# Patient Record
Sex: FEMALE | Race: WHITE | Employment: OTHER | URBAN - NONMETROPOLITAN AREA
[De-identification: names, ages, dates, MRNs, and addresses within clinical notes are randomized per-mention and may not be internally consistent; named-entity substitution may affect disease eponyms.]

---

## 2017-07-03 ENCOUNTER — TELEPHONE (OUTPATIENT)
Dept: CARDIOLOGY | Age: 62
End: 2017-07-03

## 2019-03-17 ENCOUNTER — HOSPITAL ENCOUNTER (EMERGENCY)
Age: 64
Discharge: STILL ADMITTED OR ADMINISTRATIVE | End: 2019-03-17

## 2019-10-07 ENCOUNTER — HOSPITAL ENCOUNTER (EMERGENCY)
Age: 64
Discharge: HOME | End: 2019-10-07
Attending: EMERGENCY MEDICINE
Payer: MEDICARE

## 2019-10-07 ENCOUNTER — APPOINTMENT (OUTPATIENT)
Dept: GENERAL RADIOLOGY | Age: 64
End: 2019-10-07
Payer: MEDICARE

## 2019-10-07 VITALS
TEMPERATURE: 97.8 F | RESPIRATION RATE: 16 BRPM | DIASTOLIC BLOOD PRESSURE: 78 MMHG | BODY MASS INDEX: 38.41 KG/M2 | HEIGHT: 64 IN | WEIGHT: 225 LBS | SYSTOLIC BLOOD PRESSURE: 149 MMHG | HEART RATE: 82 BPM | OXYGEN SATURATION: 98 %

## 2019-10-07 DIAGNOSIS — S42.255A CLOSED NONDISPLACED FRACTURE OF GREATER TUBEROSITY OF LEFT HUMERUS, INITIAL ENCOUNTER: Primary | ICD-10-CM

## 2019-10-07 PROCEDURE — 2709999900 HC NON-CHARGEABLE SUPPLY

## 2019-10-07 PROCEDURE — 99283 EMERGENCY DEPT VISIT LOW MDM: CPT

## 2019-10-07 PROCEDURE — 73060 X-RAY EXAM OF HUMERUS: CPT

## 2019-10-07 PROCEDURE — 6370000000 HC RX 637 (ALT 250 FOR IP): Performed by: EMERGENCY MEDICINE

## 2019-10-07 ASSESSMENT — PAIN DESCRIPTION - LOCATION: LOCATION: SHOULDER

## 2019-10-07 ASSESSMENT — PAIN DESCRIPTION - ORIENTATION: ORIENTATION: LEFT

## 2019-10-07 ASSESSMENT — PAIN SCALES - GENERAL: PAINLEVEL_OUTOF10: 9

## 2019-10-15 ENCOUNTER — HOSPITAL ENCOUNTER (OUTPATIENT)
Age: 64
Discharge: HOME | End: 2019-10-15
Payer: MEDICARE

## 2019-10-15 ENCOUNTER — HOSPITAL ENCOUNTER (OUTPATIENT)
Dept: GENERAL RADIOLOGY | Age: 64
Discharge: HOME | End: 2019-10-15
Payer: MEDICARE

## 2019-10-15 DIAGNOSIS — M25.512 LEFT SHOULDER PAIN, UNSPECIFIED CHRONICITY: ICD-10-CM

## 2019-10-15 PROCEDURE — 73030 X-RAY EXAM OF SHOULDER: CPT

## 2019-10-23 ENCOUNTER — HOSPITAL ENCOUNTER (OUTPATIENT)
Dept: CT IMAGING | Age: 64
Discharge: HOME | End: 2019-10-23
Payer: MEDICARE

## 2019-10-23 ENCOUNTER — HOSPITAL ENCOUNTER (OUTPATIENT)
Dept: GENERAL RADIOLOGY | Age: 64
Discharge: HOME | End: 2019-10-23
Payer: MEDICARE

## 2019-10-23 DIAGNOSIS — S42.255D CLOSED NONDISPLACED FRACTURE OF GREATER TUBEROSITY OF LEFT HUMERUS WITH ROUTINE HEALING, SUBSEQUENT ENCOUNTER: ICD-10-CM

## 2019-10-23 DIAGNOSIS — S42.255A CLOSED NONDISPLACED FRACTURE OF GREATER TUBEROSITY OF LEFT HUMERUS, INITIAL ENCOUNTER: ICD-10-CM

## 2019-10-23 PROCEDURE — 6360000004 HC RX CONTRAST MEDICATION: Performed by: ORTHOPAEDIC SURGERY

## 2019-10-23 PROCEDURE — 73201 CT UPPER EXTREMITY W/DYE: CPT

## 2019-10-23 PROCEDURE — 77002 NEEDLE LOCALIZATION BY XRAY: CPT

## 2019-10-23 PROCEDURE — 23350 INJECTION FOR SHOULDER X-RAY: CPT

## 2019-10-23 RX ADMIN — IOTHALAMATE MEGLUMINE 10 ML: 600 INJECTION INTRAVASCULAR at 10:53

## 2019-10-23 RX ADMIN — IOTHALAMATE MEGLUMINE 5 ML: 600 INJECTION INTRAVASCULAR at 10:10

## 2019-11-06 ENCOUNTER — HOSPITAL ENCOUNTER (OUTPATIENT)
Dept: GENERAL RADIOLOGY | Age: 64
Discharge: HOME | End: 2019-11-06
Payer: MEDICARE

## 2019-11-06 ENCOUNTER — HOSPITAL ENCOUNTER (OUTPATIENT)
Age: 64
Discharge: HOME | End: 2019-11-06
Payer: MEDICARE

## 2019-11-06 DIAGNOSIS — Z01.818 PREOP EXAMINATION: ICD-10-CM

## 2019-11-06 LAB
ANION GAP SERPL CALCULATED.3IONS-SCNC: 11 MEQ/L (ref 8–16)
BASOPHILS # BLD: 0.5 %
BASOPHILS ABSOLUTE: 0 THOU/MM3 (ref 0–0.1)
BUN BLDV-MCNC: 18 MG/DL (ref 7–22)
CALCIUM SERPL-MCNC: 9.9 MG/DL (ref 8.5–10.5)
CHLORIDE BLD-SCNC: 103 MEQ/L (ref 98–111)
CO2: 27 MEQ/L (ref 23–33)
CREAT SERPL-MCNC: 0.8 MG/DL (ref 0.4–1.2)
EOSINOPHIL # BLD: 2.7 %
EOSINOPHILS ABSOLUTE: 0.2 THOU/MM3 (ref 0–0.4)
ERYTHROCYTE [DISTWIDTH] IN BLOOD BY AUTOMATED COUNT: 12.3 % (ref 11.5–14.5)
ERYTHROCYTE [DISTWIDTH] IN BLOOD BY AUTOMATED COUNT: 42.5 FL (ref 35–45)
GFR SERPL CREATININE-BSD FRML MDRD: 72 ML/MIN/1.73M2
GLUCOSE BLD-MCNC: 95 MG/DL (ref 70–108)
HCT VFR BLD CALC: 37 % (ref 37–47)
HEMOGLOBIN: 11.8 GM/DL (ref 12–16)
IMMATURE GRANS (ABS): 0.02 THOU/MM3 (ref 0–0.07)
IMMATURE GRANULOCYTES: 0.3 %
LYMPHOCYTES # BLD: 37.2 %
LYMPHOCYTES ABSOLUTE: 2.5 THOU/MM3 (ref 1–4.8)
MCH RBC QN AUTO: 30.3 PG (ref 26–33)
MCHC RBC AUTO-ENTMCNC: 31.9 GM/DL (ref 32.2–35.5)
MCV RBC AUTO: 94.9 FL (ref 81–99)
MONOCYTES # BLD: 6.6 %
MONOCYTES ABSOLUTE: 0.4 THOU/MM3 (ref 0.4–1.3)
MRSA NASAL SCREEN RT-PCR: NEGATIVE
NUCLEATED RED BLOOD CELLS: 0 /100 WBC
PLATELET # BLD: 219 THOU/MM3 (ref 130–400)
PMV BLD AUTO: 10.8 FL (ref 9.4–12.4)
POTASSIUM SERPL-SCNC: 3.8 MEQ/L (ref 3.5–5.2)
RBC # BLD: 3.9 MILL/MM3 (ref 4.2–5.4)
SEG NEUTROPHILS: 52.7 %
SEGMENTED NEUTROPHILS ABSOLUTE COUNT: 3.5 THOU/MM3 (ref 1.8–7.7)
SODIUM BLD-SCNC: 141 MEQ/L (ref 135–145)
STAPH AUREUS SCREEN RT-PCR: NEGATIVE
WBC # BLD: 6.6 THOU/MM3 (ref 4.8–10.8)

## 2019-11-06 PROCEDURE — 87640 STAPH A DNA AMP PROBE: CPT

## 2019-11-06 PROCEDURE — 80048 BASIC METABOLIC PNL TOTAL CA: CPT

## 2019-11-06 PROCEDURE — 85025 COMPLETE CBC W/AUTO DIFF WBC: CPT

## 2019-11-06 PROCEDURE — 71046 X-RAY EXAM CHEST 2 VIEWS: CPT

## 2019-11-06 PROCEDURE — 36415 COLL VENOUS BLD VENIPUNCTURE: CPT

## 2019-11-06 PROCEDURE — 87641 MR-STAPH DNA AMP PROBE: CPT

## 2019-11-27 ENCOUNTER — HOSPITAL ENCOUNTER (OUTPATIENT)
Age: 64
Setting detail: OUTPATIENT SURGERY
Discharge: HOME | End: 2019-11-27
Attending: ORTHOPAEDIC SURGERY | Admitting: ORTHOPAEDIC SURGERY
Payer: MEDICARE

## 2019-11-27 ENCOUNTER — ANESTHESIA (OUTPATIENT)
Dept: OPERATING ROOM | Age: 64
End: 2019-11-27
Payer: MEDICARE

## 2019-11-27 ENCOUNTER — ANESTHESIA EVENT (OUTPATIENT)
Dept: OPERATING ROOM | Age: 64
End: 2019-11-27
Payer: MEDICARE

## 2019-11-27 VITALS
RESPIRATION RATE: 3 BRPM | SYSTOLIC BLOOD PRESSURE: 130 MMHG | OXYGEN SATURATION: 90 % | DIASTOLIC BLOOD PRESSURE: 59 MMHG | TEMPERATURE: 96.8 F

## 2019-11-27 VITALS
HEART RATE: 83 BPM | BODY MASS INDEX: 37.35 KG/M2 | HEIGHT: 67 IN | WEIGHT: 238 LBS | OXYGEN SATURATION: 92 % | TEMPERATURE: 97.2 F | RESPIRATION RATE: 18 BRPM | DIASTOLIC BLOOD PRESSURE: 65 MMHG | SYSTOLIC BLOOD PRESSURE: 108 MMHG

## 2019-11-27 DIAGNOSIS — S46.012A TRAUMATIC TEAR OF LEFT ROTATOR CUFF, UNSPECIFIED TEAR EXTENT, INITIAL ENCOUNTER: ICD-10-CM

## 2019-11-27 DIAGNOSIS — S42.255D CLOSED NONDISPLACED FRACTURE OF GREATER TUBEROSITY OF LEFT HUMERUS WITH ROUTINE HEALING, SUBSEQUENT ENCOUNTER: ICD-10-CM

## 2019-11-27 DIAGNOSIS — M75.42 IMPINGEMENT SYNDROME OF LEFT SHOULDER: Primary | ICD-10-CM

## 2019-11-27 LAB — POTASSIUM SERPL-SCNC: 4.6 MEQ/L (ref 3.5–5.2)

## 2019-11-27 PROCEDURE — 3700000000 HC ANESTHESIA ATTENDED CARE: Performed by: ORTHOPAEDIC SURGERY

## 2019-11-27 PROCEDURE — 3600000005 HC SURGERY LEVEL 5 BASE: Performed by: ORTHOPAEDIC SURGERY

## 2019-11-27 PROCEDURE — 36415 COLL VENOUS BLD VENIPUNCTURE: CPT

## 2019-11-27 PROCEDURE — 2500000003 HC RX 250 WO HCPCS: Performed by: REGISTERED NURSE

## 2019-11-27 PROCEDURE — 3600000015 HC SURGERY LEVEL 5 ADDTL 15MIN: Performed by: ORTHOPAEDIC SURGERY

## 2019-11-27 PROCEDURE — 2709999900 HC NON-CHARGEABLE SUPPLY: Performed by: ORTHOPAEDIC SURGERY

## 2019-11-27 PROCEDURE — 2580000003 HC RX 258: Performed by: REGISTERED NURSE

## 2019-11-27 PROCEDURE — 6360000002 HC RX W HCPCS: Performed by: ORTHOPAEDIC SURGERY

## 2019-11-27 PROCEDURE — 7100000001 HC PACU RECOVERY - ADDTL 15 MIN: Performed by: ORTHOPAEDIC SURGERY

## 2019-11-27 PROCEDURE — 6360000002 HC RX W HCPCS: Performed by: REGISTERED NURSE

## 2019-11-27 PROCEDURE — 7100000011 HC PHASE II RECOVERY - ADDTL 15 MIN: Performed by: ORTHOPAEDIC SURGERY

## 2019-11-27 PROCEDURE — 7100000000 HC PACU RECOVERY - FIRST 15 MIN: Performed by: ORTHOPAEDIC SURGERY

## 2019-11-27 PROCEDURE — 7100000010 HC PHASE II RECOVERY - FIRST 15 MIN: Performed by: ORTHOPAEDIC SURGERY

## 2019-11-27 PROCEDURE — 84132 ASSAY OF SERUM POTASSIUM: CPT

## 2019-11-27 PROCEDURE — 3700000001 HC ADD 15 MINUTES (ANESTHESIA): Performed by: ORTHOPAEDIC SURGERY

## 2019-11-27 PROCEDURE — 2580000003 HC RX 258: Performed by: ORTHOPAEDIC SURGERY

## 2019-11-27 PROCEDURE — 6360000002 HC RX W HCPCS: Performed by: ANESTHESIOLOGY

## 2019-11-27 PROCEDURE — C1713 ANCHOR/SCREW BN/BN,TIS/BN: HCPCS | Performed by: ORTHOPAEDIC SURGERY

## 2019-11-27 DEVICE — PEEK INTRALINE ANCHOR, 5.5MM WITH ¿ CIRCLE TAPER NEEDLES, 2 STRANDS #2 FORCE FIBER
Type: IMPLANTABLE DEVICE | Site: SHOULDER | Status: FUNCTIONAL
Brand: PEEK INTRALINE

## 2019-11-27 RX ORDER — SODIUM CHLORIDE, SODIUM LACTATE, POTASSIUM CHLORIDE, CALCIUM CHLORIDE 600; 310; 30; 20 MG/100ML; MG/100ML; MG/100ML; MG/100ML
INJECTION, SOLUTION INTRAVENOUS CONTINUOUS PRN
Status: DISCONTINUED | OUTPATIENT
Start: 2019-11-27 | End: 2019-11-27 | Stop reason: SDUPTHER

## 2019-11-27 RX ORDER — LEVOTHYROXINE SODIUM 0.03 MG/1
25 TABLET ORAL DAILY
COMMUNITY
End: 2022-04-05

## 2019-11-27 RX ORDER — MORPHINE SULFATE 2 MG/ML
2 INJECTION, SOLUTION INTRAMUSCULAR; INTRAVENOUS
Status: DISCONTINUED | OUTPATIENT
Start: 2019-11-27 | End: 2019-11-27 | Stop reason: HOSPADM

## 2019-11-27 RX ORDER — VASOPRESSIN 20 U/ML
INJECTION PARENTERAL PRN
Status: DISCONTINUED | OUTPATIENT
Start: 2019-11-27 | End: 2019-11-27 | Stop reason: SDUPTHER

## 2019-11-27 RX ORDER — KETOROLAC TROMETHAMINE 30 MG/ML
INJECTION, SOLUTION INTRAMUSCULAR; INTRAVENOUS PRN
Status: DISCONTINUED | OUTPATIENT
Start: 2019-11-27 | End: 2019-11-27 | Stop reason: SDUPTHER

## 2019-11-27 RX ORDER — SODIUM CHLORIDE 9 MG/ML
INJECTION, SOLUTION INTRAVENOUS CONTINUOUS
Status: DISCONTINUED | OUTPATIENT
Start: 2019-11-27 | End: 2019-11-27 | Stop reason: HOSPADM

## 2019-11-27 RX ORDER — EPHEDRINE SULFATE 50 MG/ML
INJECTION INTRAVENOUS PRN
Status: DISCONTINUED | OUTPATIENT
Start: 2019-11-27 | End: 2019-11-27 | Stop reason: SDUPTHER

## 2019-11-27 RX ORDER — GLYCOPYRROLATE 1 MG/5 ML
SYRINGE (ML) INTRAVENOUS PRN
Status: DISCONTINUED | OUTPATIENT
Start: 2019-11-27 | End: 2019-11-27 | Stop reason: SDUPTHER

## 2019-11-27 RX ORDER — ROSUVASTATIN CALCIUM 10 MG/1
10 TABLET, COATED ORAL DAILY
COMMUNITY

## 2019-11-27 RX ORDER — DEXAMETHASONE SODIUM PHOSPHATE 4 MG/ML
INJECTION, SOLUTION INTRA-ARTICULAR; INTRALESIONAL; INTRAMUSCULAR; INTRAVENOUS; SOFT TISSUE PRN
Status: DISCONTINUED | OUTPATIENT
Start: 2019-11-27 | End: 2019-11-27 | Stop reason: SDUPTHER

## 2019-11-27 RX ORDER — CYCLOBENZAPRINE HCL 10 MG
10 TABLET ORAL 3 TIMES DAILY PRN
Qty: 30 TABLET | Refills: 1 | Status: SHIPPED | OUTPATIENT
Start: 2019-11-27 | End: 2019-12-27

## 2019-11-27 RX ORDER — HYDROCODONE BITARTRATE AND ACETAMINOPHEN 5; 325 MG/1; MG/1
2 TABLET ORAL EVERY 4 HOURS PRN
Status: DISCONTINUED | OUTPATIENT
Start: 2019-11-27 | End: 2019-11-27 | Stop reason: HOSPADM

## 2019-11-27 RX ORDER — EPINEPHRINE 1 MG/ML
INJECTION, SOLUTION, CONCENTRATE INTRAVENOUS PRN
Status: DISCONTINUED | OUTPATIENT
Start: 2019-11-27 | End: 2019-11-27 | Stop reason: ALTCHOICE

## 2019-11-27 RX ORDER — HYDROCODONE BITARTRATE AND ACETAMINOPHEN 5; 325 MG/1; MG/1
1 TABLET ORAL EVERY 4 HOURS PRN
Status: DISCONTINUED | OUTPATIENT
Start: 2019-11-27 | End: 2019-11-27 | Stop reason: HOSPADM

## 2019-11-27 RX ORDER — HYDROCODONE BITARTRATE AND ACETAMINOPHEN 5; 325 MG/1; MG/1
1 TABLET ORAL EVERY 6 HOURS PRN
Qty: 28 TABLET | Refills: 0 | Status: SHIPPED | OUTPATIENT
Start: 2019-11-27 | End: 2019-12-04

## 2019-11-27 RX ORDER — HYDROCHLOROTHIAZIDE 12.5 MG/1
12.5 CAPSULE, GELATIN COATED ORAL DAILY
COMMUNITY

## 2019-11-27 RX ORDER — ROPIVACAINE HYDROCHLORIDE 2 MG/ML
INJECTION, SOLUTION EPIDURAL; INFILTRATION; PERINEURAL
Status: COMPLETED | OUTPATIENT
Start: 2019-11-27 | End: 2019-11-27

## 2019-11-27 RX ORDER — ROCURONIUM BROMIDE 10 MG/ML
INJECTION, SOLUTION INTRAVENOUS PRN
Status: DISCONTINUED | OUTPATIENT
Start: 2019-11-27 | End: 2019-11-27 | Stop reason: SDUPTHER

## 2019-11-27 RX ORDER — PROPOFOL 10 MG/ML
INJECTION, EMULSION INTRAVENOUS PRN
Status: DISCONTINUED | OUTPATIENT
Start: 2019-11-27 | End: 2019-11-27 | Stop reason: SDUPTHER

## 2019-11-27 RX ORDER — MIDAZOLAM HYDROCHLORIDE 1 MG/ML
INJECTION INTRAMUSCULAR; INTRAVENOUS PRN
Status: DISCONTINUED | OUTPATIENT
Start: 2019-11-27 | End: 2019-11-27 | Stop reason: SDUPTHER

## 2019-11-27 RX ORDER — CEPHALEXIN 500 MG/1
500 CAPSULE ORAL 2 TIMES DAILY
Qty: 14 CAPSULE | Refills: 0 | Status: SHIPPED | OUTPATIENT
Start: 2019-11-27 | End: 2019-12-04

## 2019-11-27 RX ORDER — CYCLOBENZAPRINE HCL 10 MG
10 TABLET ORAL 3 TIMES DAILY PRN
Status: DISCONTINUED | OUTPATIENT
Start: 2019-11-27 | End: 2019-11-27 | Stop reason: HOSPADM

## 2019-11-27 RX ORDER — ONDANSETRON 2 MG/ML
4 INJECTION INTRAMUSCULAR; INTRAVENOUS EVERY 6 HOURS PRN
Status: DISCONTINUED | OUTPATIENT
Start: 2019-11-27 | End: 2019-11-27 | Stop reason: HOSPADM

## 2019-11-27 RX ORDER — HYDROMORPHONE HCL 110MG/55ML
PATIENT CONTROLLED ANALGESIA SYRINGE INTRAVENOUS PRN
Status: DISCONTINUED | OUTPATIENT
Start: 2019-11-27 | End: 2019-11-27 | Stop reason: SDUPTHER

## 2019-11-27 RX ORDER — FENTANYL CITRATE 50 UG/ML
INJECTION, SOLUTION INTRAMUSCULAR; INTRAVENOUS PRN
Status: DISCONTINUED | OUTPATIENT
Start: 2019-11-27 | End: 2019-11-27 | Stop reason: SDUPTHER

## 2019-11-27 RX ORDER — OMEPRAZOLE 20 MG/1
20 CAPSULE, DELAYED RELEASE ORAL DAILY
COMMUNITY

## 2019-11-27 RX ORDER — PHENYLEPHRINE HYDROCHLORIDE 10 MG/ML
INJECTION INTRAVENOUS PRN
Status: DISCONTINUED | OUTPATIENT
Start: 2019-11-27 | End: 2019-11-27 | Stop reason: SDUPTHER

## 2019-11-27 RX ORDER — ONDANSETRON 2 MG/ML
INJECTION INTRAMUSCULAR; INTRAVENOUS PRN
Status: DISCONTINUED | OUTPATIENT
Start: 2019-11-27 | End: 2019-11-27 | Stop reason: SDUPTHER

## 2019-11-27 RX ADMIN — VASOPRESSIN 2 UNITS: 20 INJECTION INTRAVENOUS at 08:24

## 2019-11-27 RX ADMIN — ROCURONIUM BROMIDE 30 MG: 10 INJECTION INTRAVENOUS at 07:37

## 2019-11-27 RX ADMIN — PHENYLEPHRINE HYDROCHLORIDE 200 MCG: 10 INJECTION INTRAVENOUS at 08:29

## 2019-11-27 RX ADMIN — DEXAMETHASONE SODIUM PHOSPHATE 12 MG: 4 INJECTION, SOLUTION INTRAMUSCULAR; INTRAVENOUS at 07:42

## 2019-11-27 RX ADMIN — FENTANYL CITRATE 100 MCG: 50 INJECTION INTRAMUSCULAR; INTRAVENOUS at 07:31

## 2019-11-27 RX ADMIN — HYDROMORPHONE HYDROCHLORIDE 2 MG: 2 INJECTION INTRAMUSCULAR; INTRAVENOUS; SUBCUTANEOUS at 08:06

## 2019-11-27 RX ADMIN — ONDANSETRON HYDROCHLORIDE 4 MG: 4 INJECTION, SOLUTION INTRAMUSCULAR; INTRAVENOUS at 09:10

## 2019-11-27 RX ADMIN — VASOPRESSIN 2 UNITS: 20 INJECTION INTRAVENOUS at 08:29

## 2019-11-27 RX ADMIN — VASOPRESSIN 2 UNITS: 20 INJECTION INTRAVENOUS at 08:42

## 2019-11-27 RX ADMIN — SODIUM CHLORIDE, POTASSIUM CHLORIDE, SODIUM LACTATE AND CALCIUM CHLORIDE: 600; 310; 30; 20 INJECTION, SOLUTION INTRAVENOUS at 09:10

## 2019-11-27 RX ADMIN — MIDAZOLAM HYDROCHLORIDE 2 MG: 1 INJECTION, SOLUTION INTRAMUSCULAR; INTRAVENOUS at 07:31

## 2019-11-27 RX ADMIN — KETOROLAC TROMETHAMINE 30 MG: 30 INJECTION, SOLUTION INTRAMUSCULAR; INTRAVENOUS at 09:10

## 2019-11-27 RX ADMIN — PHENYLEPHRINE HYDROCHLORIDE 200 MCG: 10 INJECTION INTRAVENOUS at 08:55

## 2019-11-27 RX ADMIN — SODIUM CHLORIDE: 9 INJECTION, SOLUTION INTRAVENOUS at 08:00

## 2019-11-27 RX ADMIN — PROPOFOL 150 MG: 10 INJECTION, EMULSION INTRAVENOUS at 07:37

## 2019-11-27 RX ADMIN — PHENYLEPHRINE HYDROCHLORIDE 100 MCG: 10 INJECTION INTRAVENOUS at 07:48

## 2019-11-27 RX ADMIN — PHENYLEPHRINE HYDROCHLORIDE 200 MCG: 10 INJECTION INTRAVENOUS at 08:42

## 2019-11-27 RX ADMIN — PHENYLEPHRINE HYDROCHLORIDE 200 MCG: 10 INJECTION INTRAVENOUS at 09:10

## 2019-11-27 RX ADMIN — Medication 0.2 MG: at 07:37

## 2019-11-27 RX ADMIN — SODIUM CHLORIDE: 9 INJECTION, SOLUTION INTRAVENOUS at 06:17

## 2019-11-27 RX ADMIN — PHENYLEPHRINE HYDROCHLORIDE 200 MCG: 10 INJECTION INTRAVENOUS at 07:54

## 2019-11-27 RX ADMIN — EPHEDRINE SULFATE 50 MG: 50 INJECTION, SOLUTION INTRAVENOUS at 07:51

## 2019-11-27 RX ADMIN — VASOPRESSIN 2 UNITS: 20 INJECTION INTRAVENOUS at 08:14

## 2019-11-27 RX ADMIN — Medication 2 G: at 07:28

## 2019-11-27 RX ADMIN — PHENYLEPHRINE HYDROCHLORIDE 100 MCG: 10 INJECTION INTRAVENOUS at 07:49

## 2019-11-27 RX ADMIN — ROPIVACAINE HYDROCHLORIDE 30 ML: 2 INJECTION, SOLUTION EPIDURAL; INFILTRATION at 08:06

## 2019-11-27 ASSESSMENT — PULMONARY FUNCTION TESTS
PIF_VALUE: 17
PIF_VALUE: 22
PIF_VALUE: 1
PIF_VALUE: 21
PIF_VALUE: 28
PIF_VALUE: 21
PIF_VALUE: 28
PIF_VALUE: 1
PIF_VALUE: 31
PIF_VALUE: 27
PIF_VALUE: 2
PIF_VALUE: 21
PIF_VALUE: 1
PIF_VALUE: 26
PIF_VALUE: 21
PIF_VALUE: 33
PIF_VALUE: 30
PIF_VALUE: 22
PIF_VALUE: 28
PIF_VALUE: 22
PIF_VALUE: 23
PIF_VALUE: 23
PIF_VALUE: 21
PIF_VALUE: 22
PIF_VALUE: 25
PIF_VALUE: 28
PIF_VALUE: 27
PIF_VALUE: 31
PIF_VALUE: 27
PIF_VALUE: 26
PIF_VALUE: 23
PIF_VALUE: 2
PIF_VALUE: 28
PIF_VALUE: 23
PIF_VALUE: 24
PIF_VALUE: 26
PIF_VALUE: 26
PIF_VALUE: 2
PIF_VALUE: 26
PIF_VALUE: 26
PIF_VALUE: 30
PIF_VALUE: 23
PIF_VALUE: 22
PIF_VALUE: 32
PIF_VALUE: 23
PIF_VALUE: 23
PIF_VALUE: 26
PIF_VALUE: 28
PIF_VALUE: 27
PIF_VALUE: 60
PIF_VALUE: 22
PIF_VALUE: 30
PIF_VALUE: 28
PIF_VALUE: 25
PIF_VALUE: 22
PIF_VALUE: 31
PIF_VALUE: 33
PIF_VALUE: 30
PIF_VALUE: 2
PIF_VALUE: 22
PIF_VALUE: 21
PIF_VALUE: 2
PIF_VALUE: 21
PIF_VALUE: 24
PIF_VALUE: 33
PIF_VALUE: 21
PIF_VALUE: 23
PIF_VALUE: 22
PIF_VALUE: 22
PIF_VALUE: 24
PIF_VALUE: 22
PIF_VALUE: 22
PIF_VALUE: 30
PIF_VALUE: 22
PIF_VALUE: 24
PIF_VALUE: 24
PIF_VALUE: 22
PIF_VALUE: 21
PIF_VALUE: 25
PIF_VALUE: 30
PIF_VALUE: 23
PIF_VALUE: 22
PIF_VALUE: 21
PIF_VALUE: 26
PIF_VALUE: 2
PIF_VALUE: 26
PIF_VALUE: 28
PIF_VALUE: 26
PIF_VALUE: 1
PIF_VALUE: 24
PIF_VALUE: 6
PIF_VALUE: 24
PIF_VALUE: 22
PIF_VALUE: 26
PIF_VALUE: 26
PIF_VALUE: 28
PIF_VALUE: 22
PIF_VALUE: 23
PIF_VALUE: 24
PIF_VALUE: 21
PIF_VALUE: 22
PIF_VALUE: 24
PIF_VALUE: 2
PIF_VALUE: 26
PIF_VALUE: 21
PIF_VALUE: 30
PIF_VALUE: 24
PIF_VALUE: 23
PIF_VALUE: 22
PIF_VALUE: 22
PIF_VALUE: 27
PIF_VALUE: 1
PIF_VALUE: 25
PIF_VALUE: 22
PIF_VALUE: 26
PIF_VALUE: 23
PIF_VALUE: 32
PIF_VALUE: 22
PIF_VALUE: 31
PIF_VALUE: 22
PIF_VALUE: 23
PIF_VALUE: 3
PIF_VALUE: 26
PIF_VALUE: 29
PIF_VALUE: 26
PIF_VALUE: 22
PIF_VALUE: 23

## 2019-11-27 ASSESSMENT — PAIN SCALES - GENERAL: PAINLEVEL_OUTOF10: 0

## 2019-11-27 ASSESSMENT — PAIN - FUNCTIONAL ASSESSMENT: PAIN_FUNCTIONAL_ASSESSMENT: 0-10

## 2019-12-04 ENCOUNTER — HOSPITAL ENCOUNTER (OUTPATIENT)
Dept: OCCUPATIONAL THERAPY | Age: 64
Setting detail: THERAPIES SERIES
Discharge: HOME | End: 2019-12-04
Payer: MEDICARE

## 2019-12-04 PROCEDURE — 97165 OT EVAL LOW COMPLEX 30 MIN: CPT

## 2019-12-04 PROCEDURE — 97110 THERAPEUTIC EXERCISES: CPT

## 2019-12-04 ASSESSMENT — PAIN SCALES - GENERAL: PAINLEVEL_OUTOF10: 7

## 2019-12-11 ENCOUNTER — HOSPITAL ENCOUNTER (OUTPATIENT)
Dept: OCCUPATIONAL THERAPY | Age: 64
Setting detail: THERAPIES SERIES
Discharge: HOME | End: 2019-12-11
Payer: MEDICARE

## 2019-12-11 PROCEDURE — 97535 SELF CARE MNGMENT TRAINING: CPT

## 2019-12-11 PROCEDURE — 97110 THERAPEUTIC EXERCISES: CPT

## 2019-12-11 ASSESSMENT — PAIN DESCRIPTION - ORIENTATION: ORIENTATION: LEFT

## 2019-12-11 ASSESSMENT — PAIN SCALES - GENERAL: PAINLEVEL_OUTOF10: 4

## 2019-12-11 ASSESSMENT — PAIN DESCRIPTION - LOCATION: LOCATION: SHOULDER

## 2019-12-13 ENCOUNTER — HOSPITAL ENCOUNTER (OUTPATIENT)
Dept: OCCUPATIONAL THERAPY | Age: 64
Setting detail: THERAPIES SERIES
End: 2019-12-13
Payer: MEDICARE

## 2019-12-18 ENCOUNTER — HOSPITAL ENCOUNTER (OUTPATIENT)
Dept: OCCUPATIONAL THERAPY | Age: 64
Setting detail: THERAPIES SERIES
Discharge: HOME | End: 2019-12-18
Payer: MEDICARE

## 2019-12-18 PROCEDURE — 97110 THERAPEUTIC EXERCISES: CPT

## 2019-12-18 ASSESSMENT — PAIN SCALES - GENERAL: PAINLEVEL_OUTOF10: 3

## 2019-12-18 ASSESSMENT — PAIN DESCRIPTION - ORIENTATION: ORIENTATION: LEFT

## 2019-12-18 ASSESSMENT — PAIN DESCRIPTION - LOCATION: LOCATION: SHOULDER

## 2019-12-20 ENCOUNTER — HOSPITAL ENCOUNTER (OUTPATIENT)
Dept: OCCUPATIONAL THERAPY | Age: 64
Setting detail: THERAPIES SERIES
Discharge: HOME | End: 2019-12-20
Payer: MEDICARE

## 2019-12-20 PROCEDURE — 97110 THERAPEUTIC EXERCISES: CPT

## 2019-12-20 ASSESSMENT — PAIN SCALES - GENERAL: PAINLEVEL_OUTOF10: 5

## 2019-12-20 ASSESSMENT — PAIN DESCRIPTION - LOCATION: LOCATION: SHOULDER

## 2019-12-20 ASSESSMENT — PAIN DESCRIPTION - ORIENTATION: ORIENTATION: LEFT

## 2019-12-23 ENCOUNTER — HOSPITAL ENCOUNTER (OUTPATIENT)
Dept: OCCUPATIONAL THERAPY | Age: 64
Setting detail: THERAPIES SERIES
Discharge: HOME | End: 2019-12-23
Payer: MEDICARE

## 2019-12-23 PROCEDURE — 97110 THERAPEUTIC EXERCISES: CPT

## 2019-12-23 PROCEDURE — 97150 GROUP THERAPEUTIC PROCEDURES: CPT

## 2019-12-23 ASSESSMENT — PAIN DESCRIPTION - LOCATION: LOCATION: SHOULDER

## 2019-12-23 ASSESSMENT — PAIN SCALES - GENERAL: PAINLEVEL_OUTOF10: 3

## 2019-12-23 ASSESSMENT — PAIN DESCRIPTION - ORIENTATION: ORIENTATION: LEFT

## 2019-12-27 ENCOUNTER — HOSPITAL ENCOUNTER (OUTPATIENT)
Dept: OCCUPATIONAL THERAPY | Age: 64
Setting detail: THERAPIES SERIES
Discharge: HOME | End: 2019-12-27
Payer: MEDICARE

## 2019-12-27 PROCEDURE — 97110 THERAPEUTIC EXERCISES: CPT

## 2019-12-30 ENCOUNTER — HOSPITAL ENCOUNTER (OUTPATIENT)
Dept: OCCUPATIONAL THERAPY | Age: 64
Setting detail: THERAPIES SERIES
Discharge: HOME | End: 2019-12-30
Payer: MEDICARE

## 2019-12-30 PROCEDURE — 97110 THERAPEUTIC EXERCISES: CPT

## 2019-12-30 ASSESSMENT — PAIN DESCRIPTION - LOCATION: LOCATION: SHOULDER

## 2019-12-30 ASSESSMENT — PAIN SCALES - GENERAL: PAINLEVEL_OUTOF10: 3

## 2019-12-30 ASSESSMENT — PAIN DESCRIPTION - ORIENTATION: ORIENTATION: LEFT

## 2020-01-06 ENCOUNTER — HOSPITAL ENCOUNTER (OUTPATIENT)
Dept: OCCUPATIONAL THERAPY | Age: 65
Setting detail: THERAPIES SERIES
Discharge: HOME | End: 2020-01-06
Payer: MEDICARE

## 2020-01-06 PROCEDURE — 97110 THERAPEUTIC EXERCISES: CPT

## 2020-01-06 ASSESSMENT — PAIN SCALES - GENERAL: PAINLEVEL_OUTOF10: 3

## 2020-01-06 ASSESSMENT — PAIN DESCRIPTION - LOCATION: LOCATION: SHOULDER

## 2020-01-06 ASSESSMENT — PAIN DESCRIPTION - ORIENTATION: ORIENTATION: LEFT

## 2020-01-08 ENCOUNTER — HOSPITAL ENCOUNTER (OUTPATIENT)
Dept: OCCUPATIONAL THERAPY | Age: 65
Setting detail: THERAPIES SERIES
Discharge: HOME | End: 2020-01-08
Payer: MEDICARE

## 2020-01-08 PROCEDURE — 97110 THERAPEUTIC EXERCISES: CPT

## 2020-01-08 ASSESSMENT — PAIN SCALES - GENERAL: PAINLEVEL_OUTOF10: 3

## 2020-01-08 ASSESSMENT — PAIN DESCRIPTION - ORIENTATION: ORIENTATION: LEFT

## 2020-01-08 ASSESSMENT — PAIN DESCRIPTION - LOCATION: LOCATION: SHOULDER

## 2020-01-09 ENCOUNTER — HOSPITAL ENCOUNTER (OUTPATIENT)
Dept: INTERVENTIONAL RADIOLOGY/VASCULAR | Age: 65
Discharge: HOME | End: 2020-01-09
Payer: MEDICARE

## 2020-01-09 PROCEDURE — 93971 EXTREMITY STUDY: CPT

## 2020-01-10 ENCOUNTER — HOSPITAL ENCOUNTER (OUTPATIENT)
Dept: OCCUPATIONAL THERAPY | Age: 65
Setting detail: THERAPIES SERIES
End: 2020-01-10
Payer: MEDICARE

## 2020-01-15 ENCOUNTER — HOSPITAL ENCOUNTER (OUTPATIENT)
Dept: OCCUPATIONAL THERAPY | Age: 65
Setting detail: THERAPIES SERIES
End: 2020-01-15
Payer: MEDICARE

## 2020-01-17 ENCOUNTER — HOSPITAL ENCOUNTER (OUTPATIENT)
Dept: OCCUPATIONAL THERAPY | Age: 65
Setting detail: THERAPIES SERIES
Discharge: HOME | End: 2020-01-17
Payer: MEDICARE

## 2020-01-17 PROCEDURE — 97110 THERAPEUTIC EXERCISES: CPT

## 2020-01-17 ASSESSMENT — PAIN SCALES - GENERAL: PAINLEVEL_OUTOF10: 3

## 2020-01-17 ASSESSMENT — PAIN DESCRIPTION - ORIENTATION: ORIENTATION: LEFT

## 2020-01-17 ASSESSMENT — PAIN DESCRIPTION - LOCATION: LOCATION: SHOULDER

## 2020-01-17 NOTE — PROGRESS NOTES
[address] [address]  OUTPATIENT OCCUPATIONAL THERAPY  Progress Note  [facility]    Time In: 0830  Time Out: 0900  Minutes: 30  Timed Code Treatment Minutes: 30 Minutes                Date: 2020  Patient Name: Evgeny Patel        CSN: [id number]   : 1955  (59 y.o.)  Gender: female   Referring Practitioner: Patricia XIONG  Diagnosis: S42.255D - Closed nondisplaced fracture of greater tuberosity of left humerus with routine healing, subsequent encounter, S46.012A - Traumatic tear of left rotator cuff, unspecified tear extent, initial encounter          General:  OT Visit Information  Onset Date: 19  OT Insurance Information: Medicare - no ionto, no hot/cold packs  Total # of Visits to Date: 10  Certification Period Expiration Date: 20  Progress Note Counter: PN completed on   Comments: Return to referring physician on 20       Restrictions/Precautions:  Restrictions/Precautions: General Precautions    Position Activity Restriction  Other position/activity restrictions: Surgery on 2019 - follow protocol for Dr. Justine Garza, [facility] of L4-L5 fusion         Subjective:  Subjective: States that the physician told her to continue with therapy and to keep working her arm. States that overall she thinks that she is doing good, but it could be better. States that she tries to reach with her left arm and is not able to do it.            Pain:  Patient Currently in Pain: Yes  Pain Assessment: 0-10  Pain Level: 3  Pain Location: Shoulder  Pain Orientation: Left       Objective:     Upper Extremity Function  UE AROM: active left shoulder flexion = 60, abduciton = 52, IR = can get left thumb to left side of back pocket at hip, Er at side = 20  UE PROM: passive left shoulder flexio = 142, abduction = 100, IR = 57, Er at side = 45, ER at 90 = 28; pulleys for shoulder flexion; supine PROM to left shoulder in all planes to patient tolerance  UE AAROM: standing dowel for shoulder extension and IR up back x 10 reps each; supine dowel for shoulder flexion and chest press x 10 reps each                                                Activity Tolerance: Additional Comments: Tolerated session well    Assessment:  Assessment: Patient is making very nice and appropriate progress toward goals since evaluation. Patient is now able to go without the sling and is able to start using her left arm in her dailiy tasks. Patient has limitations in left shoulder PROM, AROM, and functional use of her left arm and continues to have difficulty with dressing, bathing, reaching , and driving. Further skilled OT services are required to address AROM, PROM, and functional strength of her left UE to allow patient to return to premorbid functional level. Patient Education:  Patient Education: goal status; supine dowel for shoulder flexion and chest press, standing dowel for shoulder extension and IR up back            Plan:  Times per week: 2 x week  Plan weeks: 6 weeks  Plan Comment: POC updated and discussed with patient   Specific instructions for Next Treatment: AROM, AAROM, PROM    Patient goals : Require less assistance with bathing, increased ease with dressing, be able to use a knife when cutting food    Short term goals  Time Frame for Short term goals: 4 weeks  Short term goal 1: Patient will impove L PROM shoulder flexion to 75 degrees and ER in scaption to 25 degrees to improve ability to groom hair. GOAL MET - SEE OBJECTIVE SECTION OF NOTE FOR DETAILS - REVSIED GOAL: Increase passive left shoulder flexion to 155, abduction to 120, IR to 75, and ER at 90 to 40 to increase flexibility to increase her future ability to complete household tasks. Short term goal 2: Patient will improve L PROM elbow extension to 5 degrees to be able to pull up skirt/pants without difficulty.  GOAL MET - REVISED GOAL: Increase active left shoulder flexion to 85, abduction to 75, get left thumb to waistband behind back, and ER at side to 40 to increase her ability to complete dressing tasks. Short term goal 3: Patient will report pain with activity no greater than 4/10 to increase ease and ability for dressing routine. GOAL MET - RELATES THAT PAIN IS NORMALLY AT 3/10 AND OCCASIONALLY GOES TO 4/10 - REVISED GOAL: Report pain no higher than 2/10 in left shoulder at any time to increase her abiltiy to perform dressing tasks. Short term goal 4: GOAL INITIATED: Be able to use left hand to assist with wash hair. Long term goals  Time Frame for Long term goals : 6 weeks  Long term goal 1: Patient will verbalize independence with HEP to improve ability to sleep through the night. GOAL MET - CONTINUE GOAL WITH UPGRADES  Long term goal 2: Patient will report no difficulty with UB dressing. GOAL NOT MET - RELATES CONTINUES TO HAVE DIFFICULTY WITH FASTENING BRA - REVISED GOAL: Be able to fasten bra in normal fashion without difficulty. Long term goal 3: Patient will report pain no more than 1/10 during functional tasks to increase ability with housekeeping tasks. GOAL NOT MET - CONTINUE GOAL  Long term goal 4: GOAL INITIATED: Be able to use left arm to turn steering wheel without difficulty. Long term goal 5: GOAL INITIATED: Be able to use left arm to retrieve item from drive-thru without difficulty.          Liss Garcia, OTR/L #[id number]

## 2020-01-22 ENCOUNTER — HOSPITAL ENCOUNTER (OUTPATIENT)
Dept: OCCUPATIONAL THERAPY | Age: 65
Setting detail: THERAPIES SERIES
Discharge: HOME | End: 2020-01-22
Payer: MEDICARE

## 2020-01-22 PROCEDURE — 97110 THERAPEUTIC EXERCISES: CPT

## 2020-01-22 ASSESSMENT — PAIN DESCRIPTION - LOCATION: LOCATION: SHOULDER

## 2020-01-22 ASSESSMENT — PAIN SCALES - GENERAL: PAINLEVEL_OUTOF10: 2

## 2020-01-22 ASSESSMENT — PAIN DESCRIPTION - ORIENTATION: ORIENTATION: LEFT

## 2020-01-24 ENCOUNTER — HOSPITAL ENCOUNTER (OUTPATIENT)
Dept: OCCUPATIONAL THERAPY | Age: 65
Setting detail: THERAPIES SERIES
Discharge: HOME | End: 2020-01-24
Payer: MEDICARE

## 2020-01-24 PROCEDURE — 97110 THERAPEUTIC EXERCISES: CPT

## 2020-01-24 ASSESSMENT — PAIN DESCRIPTION - LOCATION: LOCATION: SHOULDER

## 2020-01-24 ASSESSMENT — PAIN DESCRIPTION - ORIENTATION: ORIENTATION: LEFT

## 2020-01-24 ASSESSMENT — PAIN SCALES - GENERAL: PAINLEVEL_OUTOF10: 2

## 2020-01-29 ENCOUNTER — HOSPITAL ENCOUNTER (OUTPATIENT)
Dept: OCCUPATIONAL THERAPY | Age: 65
Setting detail: THERAPIES SERIES
End: 2020-01-29
Payer: MEDICARE

## 2020-01-31 ENCOUNTER — HOSPITAL ENCOUNTER (OUTPATIENT)
Dept: OCCUPATIONAL THERAPY | Age: 65
Setting detail: THERAPIES SERIES
End: 2020-01-31
Payer: MEDICARE

## 2020-02-05 ENCOUNTER — APPOINTMENT (OUTPATIENT)
Dept: OCCUPATIONAL THERAPY | Age: 65
End: 2020-02-05
Payer: MEDICARE

## 2020-02-07 ENCOUNTER — APPOINTMENT (OUTPATIENT)
Dept: OCCUPATIONAL THERAPY | Age: 65
End: 2020-02-07
Payer: MEDICARE

## 2020-02-12 ENCOUNTER — HOSPITAL ENCOUNTER (OUTPATIENT)
Dept: OCCUPATIONAL THERAPY | Age: 65
Setting detail: THERAPIES SERIES
End: 2020-02-12
Payer: MEDICARE

## 2020-02-14 ENCOUNTER — HOSPITAL ENCOUNTER (OUTPATIENT)
Dept: OCCUPATIONAL THERAPY | Age: 65
Setting detail: THERAPIES SERIES
Discharge: HOME | End: 2020-02-14
Payer: MEDICARE

## 2020-02-14 PROCEDURE — 97110 THERAPEUTIC EXERCISES: CPT

## 2020-02-14 ASSESSMENT — PAIN DESCRIPTION - ORIENTATION: ORIENTATION: LEFT

## 2020-02-14 ASSESSMENT — PAIN SCALES - GENERAL: PAINLEVEL_OUTOF10: 1

## 2020-02-14 ASSESSMENT — PAIN DESCRIPTION - LOCATION: LOCATION: SHOULDER

## 2020-02-14 NOTE — PROGRESS NOTES
[facility]  OUTPATIENT OCCUPATIONAL THERAPY  Daily Note  [address] [facility]    Time In: 0830  Time Out: 0900  Minutes: 30  Timed Code Treatment Minutes: 30 Minutes                Date: 2020  Patient Name: Christina Stoll        CSN: [id number]   : 1955  (59 y.o.)  Gender: female   Referring Practitioner: Viet XIONG  Diagnosis: S42.255D - Closed nondisplaced fracture of greater tuberosity of left humerus with routine healing, subsequent encounter, S46.012A - Traumatic tear of left rotator cuff, unspecified tear extent, initial encounter          General:  OT Visit Information  Onset Date: 19  OT Insurance Information: Medicare - no ionto, no hot/cold packs  Total # of Visits to Date: 15  Certification Period Expiration Date: 20  Progress Note Counter: PN completed on ; 3/10 for PN  Comments: has to call for an appointment with physician       Restrictions/Precautions:  Restrictions/Precautions: General Precautions    Position Activity Restriction  Other position/activity restrictions: Surgery on 2019 - follow protocol for Dr. Aiyana Wagoner, [facility] of L4-L5 fusion         Subjective:  Subjective: States that she continues to have difficulty with putting on her clothes. States that she also continues to have difficulty with getting her left arm over her head. States that she had to m miss her physician appointment about being ill.            Pain:  Patient Currently in Pain: Yes  Pain Assessment: 0-10  Pain Level: 1  Pain Location: Shoulder  Pain Orientation: Left       Objective:     Upper Extremity Function  UE PROM: pulleys for shoulder flexion x 20 reps; supine PROM to left shoulder in all planes to patient tolerance with prolonged hold at end of ROM  UE AAROM: seated saeboglide for left shoulder flexion x 10 reps, left shoulder scaption x 10 reps (verbal cues to not push the saeboglide forward during task0  UE Stretching: STM to left upper trapezius and bicep region with tightness present                                                Activity Tolerance: Additional Comments:  Tolerated session well    Assessment:  Assessment: Progressing toward goals slowly - patient has missed several recent appointments due to illness    Patient Education:  Patient Education: to continue to perform current HEP            Plan:  Plan Comment: Continue per established POC  Specific instructions for Next Treatment: KURT JUNIOR PROM Glendon Nickels, OTR/L #[id number]

## 2020-02-19 ENCOUNTER — HOSPITAL ENCOUNTER (OUTPATIENT)
Dept: OCCUPATIONAL THERAPY | Age: 65
Setting detail: THERAPIES SERIES
End: 2020-02-19
Payer: MEDICARE

## 2020-02-26 ENCOUNTER — HOSPITAL ENCOUNTER (OUTPATIENT)
Dept: OCCUPATIONAL THERAPY | Age: 65
Setting detail: THERAPIES SERIES
Discharge: HOME | End: 2020-02-26
Payer: MEDICARE

## 2020-02-26 PROCEDURE — 97110 THERAPEUTIC EXERCISES: CPT

## 2020-02-26 ASSESSMENT — PAIN SCALES - GENERAL: PAINLEVEL_OUTOF10: 2

## 2020-02-26 ASSESSMENT — PAIN DESCRIPTION - ORIENTATION: ORIENTATION: LEFT

## 2020-02-26 ASSESSMENT — PAIN DESCRIPTION - LOCATION: LOCATION: ARM;SHOULDER

## 2020-02-26 NOTE — FLOWSHEET NOTE
increased ease with dressing, be able to use a knife when cutting food    Short term goals  Time Frame for Short term goals: 4 weeks  Short term goal 1: Increase passive left shoulder flexion to 155, abduction to 120, IR to 75, and ER at 90 to 40 to increase flexibility to increase her future ability to complete household tasks. GOAL MET FOR IR AND ER, GOAL NOT MET FOR FLEXION AND ABDUCTION - SEE OBJECTIVE SECTION OF NOTE FOR DETAILS - REVISED GOAL; Increase passive left shoulder flexion to 155, abduction to 125, IR to 85, and ER to 60 to increase flexibility for future use of left UE in daily tasks. Short term goal 2: Increase active left shoulder flexion to 85, abduction to 75, get left thumb to waistband behind back, and ER at side to 40 to increase her ability to complete dressing tasks. GOAL MET - SEE OBJECTIVE SECTION OF NOTE FOR DETAILS - REVISED GOAL: Increase active left shoulder flexion to 105, abduction to 100, get left thumb to waistband at spine level behind back, ER at 90 to 30 to increase her ability to complete hair care. Short term goal 3: Report pain no higher than 2/10 in left shoulder at any time to increase her abiltiy to perform dressing tasks. GOAL NOT MET - RELATES THAT PAIN DOES INCREASE WITH INCREASED ACTIVITY OF LEFT ARM - CONTINUE GOAL  Short term goal 4:  Be able to use left hand to assist with wash hair. GOAL NOT MET - STATES THAT SHE IS NOT ABLE TO GET HER LEFT HAND TO THE TOP OF HER HEAD WITHOUT PAIN OR DIFFICULTY - CONTINUE GOAL  Long term goals  Time Frame for Long term goals : 8 weeks  Long term goal 1: Patient will verbalize independence with HEP to improve ability to sleep through the night. GOAL MET - CONTINUE GOAL WITH UPGRADES  Long term goal 2:  Be able to fasten bra in normal fashion without difficulty. GOAL NOT MET - CONTINUE GOAL  Long term goal 3: Patient will report pain no more than 1/10 during functional tasks to increase ability with housekeeping tasks.  GOAL NOT MET

## 2020-02-28 ENCOUNTER — APPOINTMENT (OUTPATIENT)
Dept: OCCUPATIONAL THERAPY | Age: 65
End: 2020-02-28
Payer: MEDICARE

## 2020-03-04 ENCOUNTER — HOSPITAL ENCOUNTER (OUTPATIENT)
Dept: OCCUPATIONAL THERAPY | Age: 65
Setting detail: THERAPIES SERIES
Discharge: HOME | End: 2020-03-04
Payer: MEDICARE

## 2020-03-04 PROCEDURE — 97110 THERAPEUTIC EXERCISES: CPT

## 2020-03-04 ASSESSMENT — PAIN DESCRIPTION - ORIENTATION: ORIENTATION: LEFT

## 2020-03-04 ASSESSMENT — PAIN DESCRIPTION - LOCATION: LOCATION: ARM;SHOULDER

## 2020-03-04 ASSESSMENT — PAIN SCALES - GENERAL: PAINLEVEL_OUTOF10: 2

## 2020-03-04 NOTE — PROGRESS NOTES
each  UE Stretching: STM to left upper trapezius with tightness present                                                Activity Tolerance: Additional Comments:  Tolerated session well    Assessment:  Assessment: Progressing toward goals    Patient Education:  Patient Education: information provided regarding cubital tunnel and the splint that has been ordered for her            Plan:  Plan Comment: Continue per established POC  Specific instructions for Next Treatment: AROM, PROM, AAROM, stretching; left elbow/wrist splinting                   Tiffany Kilgore OTR/L #[id number]

## 2020-03-06 ENCOUNTER — HOSPITAL ENCOUNTER (OUTPATIENT)
Dept: OCCUPATIONAL THERAPY | Age: 65
Setting detail: THERAPIES SERIES
End: 2020-03-06
Payer: MEDICARE

## 2020-03-11 ENCOUNTER — HOSPITAL ENCOUNTER (OUTPATIENT)
Dept: OCCUPATIONAL THERAPY | Age: 65
Setting detail: THERAPIES SERIES
Discharge: HOME | End: 2020-03-11
Payer: MEDICARE

## 2020-03-11 PROCEDURE — L3763 EWHO RIGID W/O JNTS CF: HCPCS

## 2020-03-11 PROCEDURE — 97110 THERAPEUTIC EXERCISES: CPT

## 2020-03-13 ENCOUNTER — HOSPITAL ENCOUNTER (OUTPATIENT)
Dept: OCCUPATIONAL THERAPY | Age: 65
Setting detail: THERAPIES SERIES
End: 2020-03-13
Payer: MEDICARE

## 2020-03-16 ENCOUNTER — HOSPITAL ENCOUNTER (OUTPATIENT)
Dept: OCCUPATIONAL THERAPY | Age: 65
Setting detail: THERAPIES SERIES
Discharge: HOME | End: 2020-03-16
Payer: MEDICARE

## 2020-03-16 PROCEDURE — 97110 THERAPEUTIC EXERCISES: CPT

## 2020-03-18 ENCOUNTER — HOSPITAL ENCOUNTER (OUTPATIENT)
Dept: OCCUPATIONAL THERAPY | Age: 65
Setting detail: THERAPIES SERIES
Discharge: HOME | End: 2020-03-18
Payer: MEDICARE

## 2020-03-18 PROCEDURE — 97110 THERAPEUTIC EXERCISES: CPT

## 2020-03-18 NOTE — PROGRESS NOTES
activity with left UE - able to place 24 clothespins on and off vertical post (needed to take 2 rest breaks during activity)                                                Activity Tolerance: Additional Comments:  Tolerated session well    Assessment:  Assessment: Progressing toward goals    Patient Education:     No new patient education completed this date          Plan:  Plan Comment: Continue per established POC  Specific instructions for Next Treatment: AROM, PROM, AAROM, stretching;                    Mat Fears, OTR/L #[id number]

## 2020-03-20 ENCOUNTER — HOSPITAL ENCOUNTER (OUTPATIENT)
Dept: OCCUPATIONAL THERAPY | Age: 65
Setting detail: THERAPIES SERIES
End: 2020-03-20
Payer: MEDICARE

## 2020-03-23 ENCOUNTER — HOSPITAL ENCOUNTER (OUTPATIENT)
Dept: OCCUPATIONAL THERAPY | Age: 65
Setting detail: THERAPIES SERIES
Discharge: HOME | End: 2020-03-23
Payer: MEDICARE

## 2020-03-23 PROCEDURE — 97110 THERAPEUTIC EXERCISES: CPT

## 2020-03-23 NOTE — PROGRESS NOTES
[facility]  OUTPATIENT OCCUPATIONAL THERAPY  Daily Note  [facility]    Time In: 5548  Time Out: 1730  Minutes: 35  Timed Code Treatment Minutes: 35 Minutes                Date: 3/23/2020  Patient Name: Serenity Barrera        CSN: [id number]   : 1955  (72 y.o.)  Gender: female   Referring Practitioner: Janna XIONG  Diagnosis: S42.255D - Closed nondisplaced fracture of greater tuberosity of left humerus with routine healing, subsequent encounter, S46.012A - Traumatic tear of left rotator cuff, unspecified tear extent, initial encounter          General:  OT Visit Information  Onset Date: 19  OT Insurance Information: Medicare - no ionto, no hot/cold packs  Total # of Visits to Date:  Period Expiration Date: 20  Progress Note Counter: PN completed on ; 5/10 for PN  Comments: returns to physician on        Restrictions/Precautions:  Restrictions/Precautions: General Precautions    Position Activity Restriction  Other position/activity restrictions: Surgery on 2019 - follow protocol for Dr. Danyelle Durbin, [facility] of L4-L5 fusion         Subjective:  Subjective: States that she has had some pinching with her night splint. Pain:  Patient Currently in Pain: Denies       Objective:     Upper Extremity Function  UE AROM: biodex at 120 speed x 3 minutes forward and 3 minutes backward - completed for ROM purposes only  UE PROM: pulleys for shoulder flexion x 20 reps; supine PROM to left shoulder in all planes to patient tolerance -improved PROM this date  UE AAROM: standing saeboglide for left shoulder flexion and scaption x 20 reps each  UE Stretching: STM to left upper trapezius and bicep region with less tightness present                                                Activity Tolerance: Additional Comments:  Tolerated session well    Assessment:  Assessment: Progressing toward goals    Patient Education:               Plan:  Plan Comment: Continue per established POC  Specific instructions for Next Treatment: AROM, PROM, AAROM, stretching;                    Raechel Crigler, OTR/L #[id number]

## 2020-03-27 ENCOUNTER — HOSPITAL ENCOUNTER (OUTPATIENT)
Dept: OCCUPATIONAL THERAPY | Age: 65
Setting detail: THERAPIES SERIES
End: 2020-03-27
Payer: MEDICARE

## 2020-03-30 ENCOUNTER — HOSPITAL ENCOUNTER (OUTPATIENT)
Dept: OCCUPATIONAL THERAPY | Age: 65
Setting detail: THERAPIES SERIES
End: 2020-03-30
Payer: MEDICARE

## 2020-05-12 ENCOUNTER — HOSPITAL ENCOUNTER (OUTPATIENT)
Dept: GENERAL RADIOLOGY | Age: 65
Discharge: HOME | End: 2020-05-12
Payer: MEDICARE

## 2020-05-12 ENCOUNTER — HOSPITAL ENCOUNTER (OUTPATIENT)
Age: 65
Discharge: HOME | End: 2020-05-12
Payer: MEDICARE

## 2020-05-12 PROCEDURE — 73630 X-RAY EXAM OF FOOT: CPT

## 2020-05-12 PROCEDURE — 73030 X-RAY EXAM OF SHOULDER: CPT

## 2020-05-22 ENCOUNTER — HOSPITAL ENCOUNTER (OUTPATIENT)
Dept: GENERAL RADIOLOGY | Age: 65
Discharge: HOME | End: 2020-05-22
Payer: MEDICARE

## 2020-05-22 ENCOUNTER — HOSPITAL ENCOUNTER (OUTPATIENT)
Dept: OCCUPATIONAL THERAPY | Age: 65
Setting detail: THERAPIES SERIES
Discharge: HOME | End: 2020-05-22
Payer: MEDICARE

## 2020-05-22 ENCOUNTER — HOSPITAL ENCOUNTER (OUTPATIENT)
Dept: CT IMAGING | Age: 65
Discharge: HOME | End: 2020-05-22
Payer: MEDICARE

## 2020-05-22 PROCEDURE — 23350 INJECTION FOR SHOULDER X-RAY: CPT

## 2020-05-22 PROCEDURE — 73201 CT UPPER EXTREMITY W/DYE: CPT

## 2020-05-22 PROCEDURE — 77002 NEEDLE LOCALIZATION BY XRAY: CPT

## 2020-05-22 PROCEDURE — 6360000004 HC RX CONTRAST MEDICATION: Performed by: ORTHOPAEDIC SURGERY

## 2020-05-22 RX ADMIN — IOTHALAMATE MEGLUMINE 11 ML: 600 INJECTION INTRAVASCULAR at 10:29

## 2020-06-16 ENCOUNTER — HOSPITAL ENCOUNTER (OUTPATIENT)
Dept: GENERAL RADIOLOGY | Age: 65
Discharge: HOME | End: 2020-06-16
Payer: MEDICARE

## 2020-06-16 ENCOUNTER — HOSPITAL ENCOUNTER (OUTPATIENT)
Age: 65
Discharge: HOME | End: 2020-06-16
Payer: MEDICARE

## 2020-06-16 PROCEDURE — 73630 X-RAY EXAM OF FOOT: CPT

## 2020-07-07 ENCOUNTER — HOSPITAL ENCOUNTER (OUTPATIENT)
Age: 65
Discharge: HOME | End: 2020-07-07
Payer: MEDICARE

## 2020-07-07 ENCOUNTER — HOSPITAL ENCOUNTER (OUTPATIENT)
Dept: GENERAL RADIOLOGY | Age: 65
Discharge: HOME | End: 2020-07-07
Payer: MEDICARE

## 2020-07-07 PROCEDURE — 73630 X-RAY EXAM OF FOOT: CPT

## 2020-12-14 ENCOUNTER — HOSPITAL ENCOUNTER (OUTPATIENT)
Dept: MAMMOGRAPHY | Age: 65
Discharge: HOME | End: 2020-12-14
Payer: MEDICARE

## 2020-12-14 PROCEDURE — 77063 BREAST TOMOSYNTHESIS BI: CPT

## 2020-12-15 ENCOUNTER — HOSPITAL ENCOUNTER (OUTPATIENT)
Dept: WOMENS IMAGING | Age: 65
Discharge: HOME | End: 2020-12-15
Payer: MEDICARE

## 2020-12-15 ENCOUNTER — HOSPITAL ENCOUNTER (OUTPATIENT)
Dept: WOMENS IMAGING | Age: 65
Discharge: HOME | End: 2020-12-15

## 2020-12-15 PROCEDURE — 3209999900 MAM COMPARISON OF OUTSIDE IMAGES

## 2020-12-21 ENCOUNTER — HOSPITAL ENCOUNTER (OUTPATIENT)
Dept: WOMENS IMAGING | Age: 65
Discharge: HOME | End: 2020-12-21

## 2020-12-21 ENCOUNTER — HOSPITAL ENCOUNTER (OUTPATIENT)
Dept: WOMENS IMAGING | Age: 65
Discharge: HOME | End: 2020-12-21
Payer: MEDICARE

## 2020-12-21 PROCEDURE — 76642 ULTRASOUND BREAST LIMITED: CPT

## 2020-12-21 PROCEDURE — 3209999900 MAM COMPARISON OF OUTSIDE IMAGES

## 2021-01-29 ENCOUNTER — HOSPITAL ENCOUNTER (OUTPATIENT)
Dept: WOMENS IMAGING | Age: 66
Discharge: HOME | End: 2021-01-29
Payer: MEDICARE

## 2021-01-29 DIAGNOSIS — N60.01 SOLITARY CYST OF RIGHT BREAST: ICD-10-CM

## 2021-01-29 PROCEDURE — 88112 CYTOPATH CELL ENHANCE TECH: CPT

## 2021-01-29 PROCEDURE — 2709999900 MAM US BREAST CYST ASPIRATION RIGHT

## 2021-02-05 ENCOUNTER — CLINICAL DOCUMENTATION (OUTPATIENT)
Dept: WOMENS IMAGING | Age: 66
End: 2021-02-05

## 2021-09-01 ENCOUNTER — HOSPITAL ENCOUNTER (OUTPATIENT)
Dept: WOMENS IMAGING | Age: 66
Discharge: HOME | End: 2021-09-01
Payer: MEDICARE

## 2021-09-01 DIAGNOSIS — N60.01 CYST OF BREAST, RIGHT, BENIGN SOLITARY: ICD-10-CM

## 2021-09-01 DIAGNOSIS — Z09 FOLLOW-UP EXAM, 3-6 MONTHS SINCE PREVIOUS EXAM: ICD-10-CM

## 2021-09-01 PROCEDURE — 76642 ULTRASOUND BREAST LIMITED: CPT

## 2022-04-05 ENCOUNTER — HOSPITAL ENCOUNTER (EMERGENCY)
Age: 67
Discharge: HOME | End: 2022-04-05
Attending: EMERGENCY MEDICINE
Payer: MEDICARE

## 2022-04-05 VITALS
SYSTOLIC BLOOD PRESSURE: 127 MMHG | TEMPERATURE: 97.8 F | HEIGHT: 67 IN | BODY MASS INDEX: 36.1 KG/M2 | DIASTOLIC BLOOD PRESSURE: 67 MMHG | WEIGHT: 230 LBS | OXYGEN SATURATION: 95 % | RESPIRATION RATE: 18 BRPM | HEART RATE: 95 BPM

## 2022-04-05 DIAGNOSIS — U07.1 COVID-19: Primary | ICD-10-CM

## 2022-04-05 LAB
ALBUMIN SERPL-MCNC: 3.8 GM/DL (ref 3.4–5)
ALP BLD-CCNC: 60 U/L (ref 46–116)
ALT SERPL-CCNC: 42 U/L (ref 14–63)
ANION GAP: 9 MEQ/L (ref 8–16)
AST SERPL-CCNC: 25 U/L (ref 15–37)
BASOPHILS # BLD: 0.3 % (ref 0–3)
BILIRUB SERPL-MCNC: 0.7 MG/DL (ref 0.2–1)
BUN BLDV-MCNC: 14 MG/DL (ref 7–18)
CHLORIDE BLD-SCNC: 101 MEQ/L (ref 98–107)
CO2: 27 MEQ/L (ref 21–32)
CREAT SERPL-MCNC: 0.9 MG/DL (ref 0.6–1.3)
EOSINOPHILS RELATIVE PERCENT: 0.9 % (ref 0–4)
GFR, ESTIMATED: 66 ML/MIN/1.73M2
GLUCOSE BLD-MCNC: 128 MG/DL (ref 74–106)
HCT VFR BLD CALC: 37.1 % (ref 37–47)
HEMOGLOBIN: 12.6 GM/DL (ref 12–16)
LYMPHOCYTES # BLD: 8.3 % (ref 15–47)
MCH RBC QN AUTO: 29.5 PG (ref 27–31)
MCHC RBC AUTO-ENTMCNC: 34.1 GM/DL (ref 33–37)
MCV RBC AUTO: 86.6 FL (ref 81–99)
MONOCYTES: 7.1 % (ref 0–12)
PDW BLD-RTO: 13.1 % (ref 11.5–14.5)
PLATELET # BLD: 180 THOU/MM3 (ref 130–400)
PLATELET ESTIMATE: ADEQUATE
PMV BLD AUTO: 8.2 FL (ref 7.4–10.4)
POC CALCIUM: 8.9 MG/DL (ref 8.5–10.1)
POTASSIUM SERPL-SCNC: 3.2 MEQ/L (ref 3.5–5.1)
RBC # BLD: 4.28 MILL/MM3 (ref 4.2–5.4)
SARS-COV-2, NAAT: DETECTED
SCAN OF BLOOD SMEAR: NORMAL
SEGS: 83.4 % (ref 43–75)
SODIUM BLD-SCNC: 137 MEQ/L (ref 136–145)
TOTAL PROTEIN: 7.4 GM/DL (ref 6.4–8.2)
WBC # BLD: 11.4 THOU/MM3 (ref 4.8–10.8)

## 2022-04-05 PROCEDURE — 87635 SARS-COV-2 COVID-19 AMP PRB: CPT

## 2022-04-05 PROCEDURE — 2580000003 HC RX 258: Performed by: EMERGENCY MEDICINE

## 2022-04-05 PROCEDURE — 6360000002 HC RX W HCPCS: Performed by: EMERGENCY MEDICINE

## 2022-04-05 PROCEDURE — 96375 TX/PRO/DX INJ NEW DRUG ADDON: CPT

## 2022-04-05 PROCEDURE — 80053 COMPREHEN METABOLIC PANEL: CPT

## 2022-04-05 PROCEDURE — 85025 COMPLETE CBC W/AUTO DIFF WBC: CPT

## 2022-04-05 PROCEDURE — 96374 THER/PROPH/DIAG INJ IV PUSH: CPT

## 2022-04-05 PROCEDURE — 99285 EMERGENCY DEPT VISIT HI MDM: CPT

## 2022-04-05 RX ORDER — BENZONATATE 100 MG/1
100 CAPSULE ORAL 2 TIMES DAILY PRN
Qty: 14 CAPSULE | Refills: 0 | Status: SHIPPED | OUTPATIENT
Start: 2022-04-05 | End: 2022-04-12

## 2022-04-05 RX ORDER — 0.9 % SODIUM CHLORIDE 0.9 %
1000 INTRAVENOUS SOLUTION INTRAVENOUS ONCE
Status: COMPLETED | OUTPATIENT
Start: 2022-04-05 | End: 2022-04-05

## 2022-04-05 RX ORDER — KETOROLAC TROMETHAMINE 30 MG/ML
15 INJECTION, SOLUTION INTRAMUSCULAR; INTRAVENOUS ONCE
Status: COMPLETED | OUTPATIENT
Start: 2022-04-05 | End: 2022-04-05

## 2022-04-05 RX ORDER — SODIUM CHLORIDE 9 MG/ML
20 INJECTION, SOLUTION INTRAVENOUS CONTINUOUS PRN
Status: DISCONTINUED | OUTPATIENT
Start: 2022-04-05 | End: 2022-04-05 | Stop reason: HOSPADM

## 2022-04-05 RX ORDER — ONDANSETRON 4 MG/1
4 TABLET, ORALLY DISINTEGRATING ORAL EVERY 8 HOURS PRN
Qty: 15 TABLET | Refills: 0 | Status: SHIPPED | OUTPATIENT
Start: 2022-04-05

## 2022-04-05 RX ORDER — SODIUM CHLORIDE 0.9 % (FLUSH) 0.9 %
5-40 SYRINGE (ML) INJECTION ONCE
Status: COMPLETED | OUTPATIENT
Start: 2022-04-05 | End: 2022-04-05

## 2022-04-05 RX ORDER — ONDANSETRON 2 MG/ML
4 INJECTION INTRAMUSCULAR; INTRAVENOUS ONCE
Status: COMPLETED | OUTPATIENT
Start: 2022-04-05 | End: 2022-04-05

## 2022-04-05 RX ORDER — BEBTELOVIMAB 87.5 MG/ML
175 INJECTION, SOLUTION INTRAVENOUS ONCE
Status: COMPLETED | OUTPATIENT
Start: 2022-04-05 | End: 2022-04-05

## 2022-04-05 RX ADMIN — SODIUM CHLORIDE, PRESERVATIVE FREE 10 ML: 5 INJECTION INTRAVENOUS at 19:27

## 2022-04-05 RX ADMIN — KETOROLAC TROMETHAMINE 15 MG: 30 INJECTION, SOLUTION INTRAMUSCULAR at 19:28

## 2022-04-05 RX ADMIN — SODIUM CHLORIDE 1000 ML: 9 INJECTION, SOLUTION INTRAVENOUS at 18:46

## 2022-04-05 RX ADMIN — BEBTELOVIMAB 175 MG: 87.5 INJECTION, SOLUTION INTRAVENOUS at 19:43

## 2022-04-05 RX ADMIN — ONDANSETRON 4 MG: 2 INJECTION INTRAMUSCULAR; INTRAVENOUS at 19:27

## 2022-04-05 ASSESSMENT — PAIN SCALES - GENERAL
PAINLEVEL_OUTOF10: 4
PAINLEVEL_OUTOF10: 3
PAINLEVEL_OUTOF10: 4

## 2022-04-05 ASSESSMENT — PAIN DESCRIPTION - LOCATION
LOCATION: HEAD
LOCATION: HEAD
LOCATION: LEG;SHOULDER

## 2022-04-05 ASSESSMENT — PAIN DESCRIPTION - PAIN TYPE
TYPE: ACUTE PAIN

## 2022-04-05 ASSESSMENT — PAIN DESCRIPTION - ORIENTATION: ORIENTATION: LEFT

## 2022-04-05 ASSESSMENT — PAIN DESCRIPTION - DESCRIPTORS
DESCRIPTORS: DISCOMFORT;SHOOTING
DESCRIPTORS: ACHING

## 2022-04-05 ASSESSMENT — PAIN DESCRIPTION - ONSET
ONSET: ON-GOING
ONSET: ON-GOING

## 2022-04-05 ASSESSMENT — PAIN DESCRIPTION - PROGRESSION: CLINICAL_PROGRESSION: NOT CHANGED

## 2022-04-05 ASSESSMENT — PAIN DESCRIPTION - FREQUENCY
FREQUENCY: INTERMITTENT
FREQUENCY: INTERMITTENT

## 2022-04-05 NOTE — ED NOTES
EUA form for sotrovimab read by patient and patient agrees to received treatment.       Nasreen Reynolds RN  04/05/22 9118

## 2022-04-05 NOTE — ED PROVIDER NOTES
[address] [facility]  [facility] [address]  Phone: [address]    Chief Complaint   Patient presents with    Dizziness    Cough    Fatigue    Shortness of Breath       HPI    Fariba Iglesias is a 79 y.o. female who presents with a complaint. Patient's been doing okay but has had some exposure to some people with Covid and she has had cough and congestion not feeling good for few days. She denies other symptoms such as vomiting or diarrhea although is not eating very well. She feels generally weak.     PAST MEDICAL HISTORY    Past Medical History:   Diagnosis Date    Degenerative disc disease     Fibromyalgia     Hypertension        SURGICAL HISTORY    Past Surgical History:   Procedure Laterality Date    BACK SURGERY  2000    L4 L5    BLADDER SUSPENSION  2011    COLONOSCOPY      KNEE SURGERY  2009,2010    left    [facility] US BREAST CYST ASPIRATION RIGHT Right 1/29/2021    [facility] US BREAST CYST ASPIRATION RIGHT 1/29/2021 Mari Walters MD [facility] [address] ARTHROSCOPY Left 11/27/2019    LEFT SHOULDER ARTHROSCOPY, ROTATOR CUFF REPAIR, OPEN MARCELO, ACROMIOPLASTY, POSS BICEP TENODESIS performed by Cesia Ferrell MD at [address] [place] [address]    Current Outpatient Rx   Medication Sig Dispense Refill    benzonatate (TESSALON) 100 MG capsule Take 1 capsule by mouth 2 times daily as needed for Cough 14 capsule 0    ondansetron (ZOFRAN ODT) 4 MG disintegrating tablet Take 1 tablet by mouth every 8 hours as needed for Nausea or Vomiting 15 tablet 0    rosuvastatin (CRESTOR) 10 MG tablet Take 10 mg by mouth daily      hydrochlorothiazide (MICROZIDE) 12.5 MG capsule Take 12.5 mg by mouth daily      omeprazole (PRILOSEC) 20 MG delayed release capsule Take 20 mg by mouth daily      Cholecalciferol (VITAMIN D3 ADULT GUMMIES PO) Take by mouth daily      BIOTIN PO Take 1,000 mcg by mouth daily      Omega 3 1000 MG CAPS Take by mouth 2 times daily      nitroGLYCERIN (NITROSTAT) 0.4 MG SL tablet Place 0.4 mg under the tongue every 5 minutes as needed for Chest pain      zolpidem (AMBIEN) 5 MG tablet Take 5 mg by mouth nightly as needed for Sleep      enalapril (VASOTEC) 10 MG tablet Take 10 mg by mouth 2 times daily      atenolol (TENORMIN) 50 MG tablet TAKE ONE TABLET DAILY, BY MOUTH. 30 tablet 5    ibuprofen (IBU) 800 MG tablet Take 1 tablet by mouth every 8 hours as needed for Pain. 90 tablet 11    aspirin 81 MG tablet Take 81 mg by mouth daily.  multivitamin (THERAGRAN) per tablet Take 1 tablet by mouth daily.  cetirizine (ZYRTEC) 10 MG tablet Take 10 mg by mouth as needed          ALLERGIES    Allergies   Allergen Reactions    Buspar [Buspirone Hcl]     Tramadol        FAMILY HISTORY    Family History   Problem Relation Age of Onset    Cancer Father        SOCIAL HISTORY    Social History     Socioeconomic History    Marital status:      Spouse name: Clemente Riley Number of children: 3    Years of education: 15    Highest education level: None   Occupational History    Occupation: Housewife   Tobacco Use    Smoking status: Never Smoker    Smokeless tobacco: Never Used   Substance and Sexual Activity    Alcohol use: No    Drug use: No    Sexual activity: Yes     Partners: Male   Other Topics Concern    None   Social History Narrative    None     Social Determinants of Health     Financial Resource Strain:     Difficulty of Paying Living Expenses: Not on file   Food Insecurity:     Worried About Running Out of Food in the Last Year: Not on file    Thea of Food in the Last Year: Not on file   Transportation Needs:     Lack of Transportation (Medical): Not on file    Lack of Transportation (Non-Medical):  Not on file   Physical Activity:     Days of Exercise per Week: Not on file    Minutes of Exercise per Session: Not on file   Stress:     Feeling of Stress : Not on file Social Connections:     Frequency of Communication with Friends and Family: Not on file    Frequency of Social Gatherings with Friends and Family: Not on file    Attends Alevism Services: Not on file    Active Member of Clubs or Organizations: Not on file    Attends Club or Organization Meetings: Not on file    Marital Status: Not on file   Intimate Partner Violence:     Fear of Current or Ex-Partner: Not on file    Emotionally Abused: Not on file    Physically Abused: Not on file    Sexually Abused: Not on file   Housing Stability:     Unable to Pay for Housing in the Last Year: Not on file    Number of [place] in the Last Year: Not on file    Unstable Housing in the Last Year: Not on file       REVIEW OF SYSTEMS    Positive for cough and congestion. No bowel or bladder habit changes. Some weakness  All systems negative except as marked. PHYSICAL EXAM    VITAL SIGNS: /67   Pulse 95   Resp 18   Ht 5' 7\" (1.702 m)   Wt 230 lb (104.3 kg)   SpO2 95%   BMI 36.02 kg/m²    Constitutional:  Alert not toxic or ill, able to give coherent history  HENT:  normocephalic, Atraumatic,  Bilateral external ears normal, Oropharynx moist, No oral exudates, Nose normal.  Cervical Spine: Normal range of motion,  No stridor. No tenderness, Supple,  Eyes:  No discharge or  Swelling,Conjunctiva normal, PERRL, EOMI,  Respiratory: No respiratory distress, Normal breath sounds,  No wheezing, No chest tenderness. Cardiovascular:  Normal heart rate, Normal rhythm, No murmurs, No rubs, No gallops. GI:  No reproducible pain, Bowel sounds normal, Soft, No masses, No pulsatile masses. No tenderness  Musculoskeletal:  Intact distal pulses, trace ankle edema, No tenderness, No cyanosis, No clubbing. Good range of motion in all major joints. No tenderness to palpation or major deformities noted. Back:No tenderness.    Integument:  Warm, Dry, No erythema, No rash (on exposed areas)   Lymphatic:  No lymphadenopathy noted. Neurologic:  Alert & oriented x 3, Normal motor function, Normal sensory function, No focal deficits noted. Psychiatric:  Affect normal, Judgment normal, Mood normal.     EKG                      RADIOLOGY    No orders to display       PROCEDURES    none      CONSULTS:  None      CRITICAL CARE:  None      SCREENINGS:  /67   Pulse 95   Resp 18   Ht 5' 7\" (1.702 m)   Wt 230 lb (104.3 kg)   SpO2 95%   BMI 36.02 kg/m²     Screening For Hypertension and Follow-up (#317)   previously diagnosed with hypertension and not applicable for screen      Screening For Tobacco Use and Cessation Intervention (#226):   reports that she has never smoked. She has never used smokeless tobacco.  Non-smoker not applicable for screen      ED COURSE & MEDICAL DECISION MAKING    Pertinent Labs & Imaging studies reviewed. (See chart for details)  Patient has URI cough congestion. Exposure to Covid positive patients. Her oxygen level looks good. Does have generalized weakness and not eating well. She has elevated BMI. She is a good candidate for monoclonal antibodies of Covid positive. Giving IV fluids and checking labs. She is actually a good candidate for monoclonal's even with an exposure given history of hypertensive and other chronic disease. REASSESSMENT  7:24 PM  Patient rechecked and updated on lab/xray status, progress and results. Patient was reassessed and condition was unchanged after no treatment. .. Needs further monitoring at this time. Lab test show a little bit of low potassium of unclear significance. White count is elevated. Covid is positive. She is a good candidate for monoclonal antibodies at this time although ox level looks good and labs are reassuring she should be able to go home and have close follow-up with primary care.   Treat supportively with antitussive, nausea medication and follow-up.    8:52 PM EDT  Patient tolerated monoclonal antibodies infusion without difficulty. Give a shot of some Toradol. Did not significantly help her headache although she does not want anything else stronger.   Treating supportively with antitussives and nausea medication at home        FINAL IMPRESSION    1. COVID-19         PATIENT REFERRED TO:  Gaston Lantigua [address]  [phone]    Call   For evaluation      DISCHARGE MEDICATIONS:  New Prescriptions    BENZONATATE (TESSALON) 100 MG CAPSULE    Take 1 capsule by mouth 2 times daily as needed for Cough    ONDANSETRON (ZOFRAN ODT) 4 MG DISINTEGRATING TABLET    Take 1 tablet by mouth every 8 hours as needed for Nausea or Vomiting           Polly Padgett MD  04/05/22 2053       Polly Padgett MD  04/05/22 2054

## 2022-04-05 NOTE — ED TRIAGE NOTES
Patient arrived with daughter reports she has been exposed the past 10 days to family in the house who are covid positive. States she tested Thursday and was negative. States her family member in the house tested positive today. Complaints of cough, left shoulder pain, weakness, dizziness, emesis and sweating at times. Been feeling this way since Saturday.

## 2022-04-06 ENCOUNTER — CARE COORDINATION (OUTPATIENT)
Dept: CARE COORDINATION | Age: 67
End: 2022-04-06

## 2022-04-06 NOTE — CARE COORDINATION
Attempted to reach patient for ED follow up in regards to COVID-19 education/ monitoring. Patient was unavailable at the time of my call, and a generic voicemail message was left asking patient to return my call at [phone].

## 2022-04-06 NOTE — ED NOTES
Patient is pink warm and dry. Breathing without difficulty. Prescriptions explained, patient states understanding. AVS reviewed including follow up appointments, treatment, diagnosis and care of self at home. Denies questions or concerns. Patient remains alert and oriented. Patient discharged in stable condition. Patient is awake alert oriented. No sign of allergic reaction at this time. Discharged home with Daughter at bedside.       Zabrina Bryan RN  04/05/22 2043

## 2022-04-07 NOTE — CARE COORDINATION
Attempted to reach patient for ED follow up in regards to COVID-19 education/ monitoring. Patient was unavailable at the time of my call, and a generic voicemail message was left asking patient to return my call at [phone].

## 2022-07-21 ENCOUNTER — NURSE ONLY (OUTPATIENT)
Dept: LAB | Age: 67
End: 2022-07-21

## 2022-07-22 LAB
CANDIDA SPECIES, DNA PROBE: NEGATIVE
GARDNERELLA VAGINALIS, DNA PROBE: NEGATIVE
SOURCE: NORMAL
TRICHOMONAS VAGINALIS DNA: NEGATIVE

## 2023-01-08 ENCOUNTER — APPOINTMENT (OUTPATIENT)
Dept: GENERAL RADIOLOGY | Age: 68
End: 2023-01-08
Payer: MEDICARE

## 2023-01-08 ENCOUNTER — HOSPITAL ENCOUNTER (EMERGENCY)
Age: 68
Discharge: HOME | End: 2023-01-08
Attending: EMERGENCY MEDICINE
Payer: MEDICARE

## 2023-01-08 VITALS
OXYGEN SATURATION: 96 % | TEMPERATURE: 97.8 F | HEART RATE: 57 BPM | RESPIRATION RATE: 16 BRPM | SYSTOLIC BLOOD PRESSURE: 145 MMHG | DIASTOLIC BLOOD PRESSURE: 75 MMHG

## 2023-01-08 DIAGNOSIS — M25.562 ACUTE PAIN OF LEFT KNEE: Primary | ICD-10-CM

## 2023-01-08 DIAGNOSIS — S83.8X2A SPRAIN OF OTHER LIGAMENT OF LEFT KNEE, INITIAL ENCOUNTER: ICD-10-CM

## 2023-01-08 PROCEDURE — 99283 EMERGENCY DEPT VISIT LOW MDM: CPT

## 2023-01-08 PROCEDURE — 73564 X-RAY EXAM KNEE 4 OR MORE: CPT

## 2023-01-08 RX ORDER — HYDROXYZINE HYDROCHLORIDE 25 MG/1
25 TABLET, FILM COATED ORAL 3 TIMES DAILY PRN
COMMUNITY

## 2023-01-08 ASSESSMENT — PAIN SCALES - GENERAL
PAINLEVEL_OUTOF10: 4
PAINLEVEL_OUTOF10: 5

## 2023-01-08 ASSESSMENT — PAIN DESCRIPTION - PAIN TYPE
TYPE: ACUTE PAIN
TYPE: ACUTE PAIN

## 2023-01-08 ASSESSMENT — PAIN DESCRIPTION - ORIENTATION
ORIENTATION: LEFT
ORIENTATION: LEFT

## 2023-01-08 ASSESSMENT — PAIN - FUNCTIONAL ASSESSMENT
PAIN_FUNCTIONAL_ASSESSMENT: 0-10
PAIN_FUNCTIONAL_ASSESSMENT: PREVENTS OR INTERFERES SOME ACTIVE ACTIVITIES AND ADLS
PAIN_FUNCTIONAL_ASSESSMENT: 0-10

## 2023-01-08 ASSESSMENT — PAIN DESCRIPTION - LOCATION
LOCATION: KNEE
LOCATION: KNEE

## 2023-01-08 ASSESSMENT — PAIN DESCRIPTION - DESCRIPTORS
DESCRIPTORS: ACHING
DESCRIPTORS: BURNING

## 2023-01-08 NOTE — ED PROVIDER NOTES
[address] [facility] [address]  [address] [place] [address] [address]  Phone: [address]      Pt Name: Zayda Macedo  MRN: [id number]  [place] 1955  Date of evaluation: 1/8/2023  Provider: Levy Rodriguez MD    CHIEF COMPLAINT       Chief Complaint   Patient presents with    Knee Injury     left         HISTORY OF PRESENT ILLNESS      Zayda Macedo is a 79 y.o. female who presents to the emergency department via private vehicle with above-mentioned complaints. She said that she injured her left knee at home last night. She was pulling a bed when she felt a pop in the back of her knee. She is complaining of moderate, sharp, intermittent pain which is worse with neck flexion. Pain did not radiate anywhere. She denies focal weakness or numbness. She denies other injuries. She had history of knee arthritis and prior knee arthroscopic procedures. REVIEW OF SYSTEMS       Review of Systems  All systems negative except as marked. PAST MEDICAL HISTORY     Past Medical History:   Diagnosis Date    Degenerative disc disease     Fibromyalgia     Hypertension          SURGICAL HISTORY       Past Surgical History:   Procedure Laterality Date    BACK SURGERY  2000    L4 L5    BLADDER SUSPENSION  2011    COLONOSCOPY      KNEE SURGERY  2009,2010    left    [facility] US BREAST CYST ASPIRATION RIGHT Right 1/29/2021    [facility] US BREAST CYST ASPIRATION RIGHT 1/29/2021 [facility] Left 11/27/2019    LEFT SHOULDER ARTHROSCOPY, ROTATOR CUFF REPAIR, OPEN MARCELO, ACROMIOPLASTY, POSS BICEP TENODESIS performed by Vikram Wilcox MD at [address]       Previous Medications    ASPIRIN 81 MG TABLET    Take 81 mg by mouth daily. ATENOLOL (TENORMIN) 50 MG TABLET    TAKE ONE TABLET DAILY, BY MOUTH.     BIOTIN PO    Take 1,000 mcg by mouth daily    CETIRIZINE (ZYRTEC) 10 MG TABLET    Take 10 mg by mouth as needed CHOLECALCIFEROL (VITAMIN D3 ADULT GUMMIES PO)    Take by mouth daily    ENALAPRIL (VASOTEC) 10 MG TABLET    Take 10 mg by mouth 2 times daily    HYDROCHLOROTHIAZIDE (MICROZIDE) 12.5 MG CAPSULE    Take by mouth daily Takes twice a week    HYDROXYZINE HCL (ATARAX) 25 MG TABLET    Take 25 mg by mouth 3 times daily as needed for Itching    IBUPROFEN (IBU) 800 MG TABLET    Take 1 tablet by mouth every 8 hours as needed for Pain. MULTIVITAMIN (THERAGRAN) PER TABLET    Take 1 tablet by mouth daily. NITROGLYCERIN (NITROSTAT) 0.4 MG SL TABLET    Place 0.4 mg under the tongue every 5 minutes as needed for Chest pain    OMEGA 3 1000 MG CAPS    Take by mouth 2 times daily    OMEPRAZOLE (PRILOSEC) 20 MG DELAYED RELEASE CAPSULE    Take 20 mg by mouth daily    ONDANSETRON (ZOFRAN ODT) 4 MG DISINTEGRATING TABLET    Take 1 tablet by mouth every 8 hours as needed for Nausea or Vomiting    ROSUVASTATIN (CRESTOR) 10 MG TABLET    Take 10 mg by mouth daily    ZOLPIDEM (AMBIEN) 5 MG TABLET    Take 5 mg by mouth nightly as needed for Sleep       ALLERGIES       Buspar [buspirone hcl] and Tramadol    FAMILY HISTORY       Family History   Problem Relation Age of Onset    Cancer Father           SOCIAL HISTORY       Social History     Tobacco Use    Smoking status: Never    Smokeless tobacco: Never   Substance Use Topics    Alcohol use: No    Drug use: No         PHYSICAL EXAM       Physical Exam  Vitals and nursing note reviewed. Constitutional:       General: She is not in acute distress. Appearance: She is not ill-appearing. HENT:      Head: Atraumatic. Musculoskeletal:      Comments: Examination of the left knee showed no swelling or visible abnormality. There is no knee effusion. There is pain in the posterior knee with flexion which is limited to 90 degrees. Inversion of the left lower leg causes some pain but not eversion. Ligaments are stable. She has normal neurovascular examination of the left lower extremity. Neurological:      Mental Status: She is alert. DIAGNOSTIC RESULTS       RADIOLOGY:     Left knee x-ray showed mild effusion and osteoarthritis. Interpretation per the Radiologist below, if available at the time of this note:    XR KNEE LEFT (MIN 4 VIEWS)   Final Result   1. Mild tricompartmental degenerative changes of the left knee. 2. Small joint effusion. **This report has been created using voice recognition software. It may contain minor errors which are inherent in voice recognition technology. **      Final report electronically signed by Dr Ronaldo Win on 1/8/2023 1:07 PM            LABS:  Labs Reviewed - No data to display    All other labs were within normal range or not returned as of this dictation. MIPS    Not applicable      EMERGENCY DEPARTMENT COURSE and DIFFERENTIAL DIAGNOSIS/MDM:   Patient presented with left knee injury as mentioned above, head x-ray was reviewed by me as mentioned above she received ice, she declined pain meds. She is stable for discharge. I discussed discharge instructions with her. 1)  Number and Complexity of Problems  Problem List This Visit:    Problem List Items Addressed This Visit    None  Visit Diagnoses       Acute pain of left knee    -  Primary    Sprain of other ligament of left knee, initial encounter                Differential Diagnosis:   Left knee sprain, fracture, dislocation    2)  Data Reviewed    Imaging that is independently reviewed and interpreted by me as:  Mild osteoarthritis of left knee, no fracture or dislocation    See more data below for the lab and radiology tests and orders. 3)  Treatment and Disposition    Shared Decision Making:  Not applicable      REASSESSMENT   2:04 PM           PROCEDURES:  Unless otherwise noted below, none     Procedures (None if blank)    FINAL IMPRESSION      1. Acute pain of left knee    2.  Sprain of other ligament of left knee, initial encounter          DISPOSITION/PLAN DISPOSITION Decision To Discharge 01/08/2023 02:01:15 PM      PATIENT REFERRED TO:  Elsa Nicole  [address] [facility]  [address]  [phone]    In 2 days      DISCHARGE MEDICATIONS:  New Prescriptions    No medications on file       (Please note that portions of this note were completed with a voice recognition program.  Efforts were made to edit the dictations but occasionally words are mis-transcribed.)    Alfonso Shaffer MD (electronically signed)  Attending Emergency Physician                       Alfonso Shaffer MD  01/08/23 1638

## 2023-01-08 NOTE — ED NOTES
Pt pink, warm and dry, breathing with ease. Instructed on ACE wrap, can also use a sleeve or velcro wrap whatever is easiest per Dr. Nathalia Stewart. AVS reviewed including follow up appointments, diagnosis, and care of self at home. Denies questions or concerns. Pt remains alert and oriented. Pt discharged in stable condition.        Luis Enrique Merlos RN  01/08/23 [id number]

## 2023-01-08 NOTE — ED NOTES
Patient presents to the ED via private auto with daughter with complaints of left knee injury and pain. States that she was helping care for her sister and injured her knee rocking back and forth as she was using some lift equipment. Patient has intermittent numbness and tingling.      Lionel Galeazzi, RN  01/08/23 [id number]

## 2023-01-08 NOTE — DISCHARGE INSTRUCTIONS
Please apply ice as tolerated for 24-hour  Please rest and elevate your left knee  Please take Tylenol 500 g every 6 hours as needed for pain  Please use knee brace while walking  Please return if worse or new symptoms

## 2023-05-08 ENCOUNTER — HOSPITAL ENCOUNTER (EMERGENCY)
Age: 68
Discharge: ANOTHER HOSPITAL | End: 2023-05-08
Attending: EMERGENCY MEDICINE
Payer: MEDICARE

## 2023-05-08 ENCOUNTER — APPOINTMENT (OUTPATIENT)
Dept: GENERAL RADIOLOGY | Age: 68
End: 2023-05-08
Payer: MEDICARE

## 2023-05-08 VITALS
HEART RATE: 67 BPM | TEMPERATURE: 97.5 F | HEIGHT: 67 IN | SYSTOLIC BLOOD PRESSURE: 149 MMHG | DIASTOLIC BLOOD PRESSURE: 133 MMHG | OXYGEN SATURATION: 98 % | BODY MASS INDEX: 36.1 KG/M2 | RESPIRATION RATE: 16 BRPM | WEIGHT: 230 LBS

## 2023-05-08 DIAGNOSIS — R07.9 CHEST PAIN IN ADULT: Primary | ICD-10-CM

## 2023-05-08 DIAGNOSIS — R55 PRE-SYNCOPE: ICD-10-CM

## 2023-05-08 LAB
ALBUMIN SERPL BCP-MCNC: 3.5 GM/DL (ref 3.4–5)
ALP SERPL-CCNC: 74 U/L (ref 46–116)
ALT SERPL W P-5'-P-CCNC: 33 U/L (ref 14–63)
ANION GAP SERPL CALC-SCNC: 7 MEQ/L (ref 8–16)
AST SERPL W P-5'-P-CCNC: 23 U/L (ref 15–37)
BASOPHILS # BLD: 0.3 % (ref 0–3)
BASOPHILS ABSOLUTE: 0 THOU/MM3 (ref 0–0.1)
BILIRUB SERPL-MCNC: 0.3 MG/DL (ref 0.2–1)
BUN SERPL-MCNC: 26 MG/DL (ref 7–18)
CALCIUM SERPL-MCNC: 9.1 MG/DL (ref 8.5–10.1)
CHLORIDE SERPL-SCNC: 105 MEQ/L (ref 98–107)
CO2 SERPL-SCNC: 29 MEQ/L (ref 21–32)
CREAT SERPL-MCNC: 0.9 MG/DL (ref 0.6–1.3)
EOSINOPHILS ABSOLUTE: 0.1 THOU/MM3 (ref 0–0.5)
EOSINOPHILS RELATIVE PERCENT: 2.3 % (ref 0–4)
GFR SERPL CREATININE-BSD FRML MDRD: > 60 ML/MIN/1.73M2
GLUCOSE SERPL-MCNC: 95 MG/DL (ref 74–106)
HCT VFR BLD CALC: 34 % (ref 37–47)
HEMOGLOBIN: 11.3 GM/DL (ref 12–16)
IMMATURE GRANS (ABS): 0.01 THOU/MM3 (ref 0–0.07)
IMMATURE GRANULOCYTES: 0 %
LYMPHOCYTES # BLD AUTO: 32.4 % (ref 15–47)
LYMPHOCYTES ABSOLUTE: 2 THOU/MM3 (ref 1–4.8)
MCH RBC QN AUTO: 29.9 PG (ref 26–32)
MCHC RBC AUTO-ENTMCNC: 33.2 GM/DL (ref 31–35)
MCV RBC AUTO: 89.9 FL (ref 81–99)
MONOCYTES: 0.5 THOU/MM3 (ref 0.3–1.3)
MONOCYTES: 7.7 % (ref 0–12)
NT PRO BNP: 373 PG/ML (ref 0–900)
PDW BLD-RTO: 12.8 % (ref 11.5–14.9)
PLATELET # BLD AUTO: 212 THOU/MM3 (ref 130–400)
PMV BLD AUTO: 10.2 FL (ref 9.4–12.4)
POTASSIUM SERPL-SCNC: 4.3 MEQ/L (ref 3.5–5.1)
PROT SERPL-MCNC: 7.1 GM/DL (ref 6.4–8.2)
RBC # BLD: 3.78 MILL/MM3 (ref 4.1–5.3)
SEG NEUTROPHILS: 57.1 % (ref 43–75)
SEGMENTED NEUTROPHILS ABSOLUTE COUNT: 3.5 THOU/MM3 (ref 1.8–7.7)
SODIUM SERPL-SCNC: 141 MEQ/L (ref 136–145)
TROPONIN, HIGH SENSITIVITY: 6.3 PG/ML (ref 0–51.3)
TROPONIN, HIGH SENSITIVITY: 6.6 PG/ML (ref 0–51.3)
WBC # BLD: 6.1 THOU/MM3 (ref 4.8–10.8)

## 2023-05-08 PROCEDURE — 80053 COMPREHEN METABOLIC PANEL: CPT

## 2023-05-08 PROCEDURE — 93010 ELECTROCARDIOGRAM REPORT: CPT | Performed by: INTERNAL MEDICINE

## 2023-05-08 PROCEDURE — 99285 EMERGENCY DEPT VISIT HI MDM: CPT

## 2023-05-08 PROCEDURE — 71045 X-RAY EXAM CHEST 1 VIEW: CPT

## 2023-05-08 PROCEDURE — 83880 ASSAY OF NATRIURETIC PEPTIDE: CPT

## 2023-05-08 PROCEDURE — 84484 ASSAY OF TROPONIN QUANT: CPT

## 2023-05-08 PROCEDURE — 6370000000 HC RX 637 (ALT 250 FOR IP): Performed by: EMERGENCY MEDICINE

## 2023-05-08 PROCEDURE — 85025 COMPLETE CBC W/AUTO DIFF WBC: CPT

## 2023-05-08 PROCEDURE — 93005 ELECTROCARDIOGRAM TRACING: CPT | Performed by: EMERGENCY MEDICINE

## 2023-05-08 RX ORDER — ZINC GLUCONATE 50 MG
50 TABLET ORAL DAILY
COMMUNITY

## 2023-05-08 RX ORDER — ASPIRIN 81 MG/1
324 TABLET, CHEWABLE ORAL ONCE
Status: COMPLETED | OUTPATIENT
Start: 2023-05-08 | End: 2023-05-08

## 2023-05-08 RX ADMIN — ASPIRIN 81 MG 243 MG: 81 TABLET ORAL at 11:24

## 2023-05-08 RX ADMIN — ALUMINUM HYDROXIDE, MAGNESIUM HYDROXIDE, AND SIMETHICONE: 200; 200; 20 SUSPENSION ORAL at 11:24

## 2023-05-08 ASSESSMENT — PAIN DESCRIPTION - ORIENTATION: ORIENTATION: MID

## 2023-05-08 ASSESSMENT — HEART SCORE: ECG: 0

## 2023-05-08 ASSESSMENT — PAIN DESCRIPTION - DESCRIPTORS: DESCRIPTORS: BURNING

## 2023-05-08 ASSESSMENT — PAIN DESCRIPTION - PAIN TYPE: TYPE: ACUTE PAIN

## 2023-05-08 ASSESSMENT — PAIN - FUNCTIONAL ASSESSMENT: PAIN_FUNCTIONAL_ASSESSMENT: 0-10

## 2023-05-08 ASSESSMENT — PAIN DESCRIPTION - LOCATION: LOCATION: CHEST

## 2023-05-08 ASSESSMENT — PAIN SCALES - GENERAL: PAINLEVEL_OUTOF10: 5

## 2023-05-08 ASSESSMENT — PAIN DESCRIPTION - FREQUENCY: FREQUENCY: CONTINUOUS

## 2023-05-08 NOTE — ED NOTES
Dr. Dayami Concepcion at pt. Bedside, discussing poc with pt. And daughter. Pt. Requesting [facility] for admission.       Rosalind Bhardwaj RN  05/08/23 [address] Cassandra Santacruz RN  05/08/23 1217

## 2023-05-08 NOTE — ED NOTES
Pt states she has slight heart burn again and keeps belching. Pt is wondering if there's no beds if she could just go home. This was discussed with Dr. Eric Lujan and he wants her to be admitted as pt had syncope and chest pain at the same time and needs to be monitored. Pt states understanding. Peanut butter and crackers given.       Luis Lazaro RN  05/08/23 8571

## 2023-05-08 NOTE — ED TRIAGE NOTES
Pt was in her car and felt hot burning and tingling from feet up legs and into chest and arms. Then she saw black spots. Pt states she's had syncope in the past but has never felt like this before. Dr. Monique Freire pt.
never used

## 2023-05-08 NOTE — ED NOTES
Pt is pink, warm and dry, breathing with ease. Denies heart burn or any pain at this time. Pt states she's been belching a lot. INT intact to RAC without redness or edema. Denies any SOB, tingling sensations or related symptoms. Pt transported to [facility] per EMS in stable condition.       Tk Bertrand RN  05/08/23 4855

## 2023-05-08 NOTE — ED NOTES
Called [address] KASEY Denise RN, they are still waiting on discharges and he's not sure when they'll have a bed.       Girish Melo RN  05/08/23 2000

## 2023-05-08 NOTE — ED PROVIDER NOTES
[address]  [address] [facility] [address]  Phone: [address]      Pt Name: Taryn Bernardo  MRN: [id number]  [place] 1955  Date of evaluation: 5/8/2023  Provider: Norah Werner MD    [address] [facility]       Chief Complaint   Patient presents with    Palpitations     Heart pounding, burning and tingling from feet up legs, and heart burn. HISTORY OF PRESENT ILLNESS      Taryn Bernardo is a 76 y.o. female with history of hypertension, hyperlipidemia and obesity who presents to the emergency department via private vehicle with above-mentioned complaints. She was driving her car when she felt lightheaded, she felt her vision dimmed and she almost passed out. She felt her heart beating fast.  She developed heartburn afterwards. She describes her heartburn is 5 out of 10. She has history of hypertension, no history of CAD. She has loop recorder which was inserted 2 years ago and did not show arrhythmia. She denies shortness of breath or cough. She said that she was feeling fine at presentation. REVIEW OF SYSTEMS       Review of Systems  All systems negative except as as mentioned above.     PAST MEDICAL HISTORY     Past Medical History:   Diagnosis Date    Degenerative disc disease     Fibromyalgia     Hypertension          SURGICAL HISTORY       Past Surgical History:   Procedure Laterality Date    BACK SURGERY  01/01/2000    L4 L5    BLADDER SUSPENSION  01/01/2011    COLONOSCOPY      INSERTABLE CARDIAC MONITOR  2021    Dr. Arlene Manriquez  [id number]    left    [facility] US BREAST CYST ASPIRATION RIGHT Right 01/29/2021    [facility] US BREAST CYST ASPIRATION RIGHT 1/29/2021 Forrest Linder MD [address] ARTHROSCOPY Left 11/27/2019    LEFT SHOULDER ARTHROSCOPY, ROTATOR CUFF REPAIR, OPEN MARCELO, ACROMIOPLASTY, POSS BICEP TENODESIS performed by Larry Vega MD at [address] [facility]       Previous Medications

## 2023-05-08 NOTE — ED NOTES
[facility] contacted and spoke to jen Mora suprevisor regarding admit.      Catarina Merchant RN  05/08/23 [address] [facility] Arline Bettencourt, MARYCARMEN  05/08/23 1214

## 2023-05-10 LAB
EKG Q-T INTERVAL: 390 MS
EKG QRS DURATION: 86 MS
EKG QTC CALCULATION (BAZETT): 395 MS
EKG R AXIS: 13 DEGREES
EKG T AXIS: 14 DEGREES
EKG VENTRICULAR RATE: 62 BPM

## (undated) DEVICE — GLOVE ORANGE PI 7 1/2   MSG9075

## (undated) DEVICE — [ARTHROSCOPY PUMP,  DO NOT USE IF PACKAGE IS DAMAGED,  KEEP DRY,  KEEP AWAY FROM SUNLIGHT,  PROTECT FROM HEAT AND RADIOACTIVE SOURCES.]: Brand: FLOSTEADY

## (undated) DEVICE — SOLUTION IV 1000ML LAC RINGERS PH 6.5 INJ USP VIAFLX PLAS

## (undated) DEVICE — Device

## (undated) DEVICE — GLOVE SURG SZ 65 THK91MIL LTX FREE SYN POLYISOPRENE

## (undated) DEVICE — SOLUTION IV IRRIG WATER 1000ML POUR BRL 2F7114

## (undated) DEVICE — SOLUTION IV IRRIG POUR BRL 0.9% SODIUM CHL 2F7124

## (undated) DEVICE — SUTURE VCRL SZ 2-0 L27IN ABSRB UD L36MM CP-1 1/2 CIR REV J266H

## (undated) DEVICE — GOWN,SIRUS,NON REINFRCD,LARGE,SET IN SL: Brand: MEDLINE

## (undated) DEVICE — 3M™ IOBAN™ 2 ANTIMICROBIAL INCISE DRAPE 6650EZ: Brand: IOBAN™ 2

## (undated) DEVICE — [NON-THREADED CANNULA.  DO NOT RESTERILIZE,  DO NOT USE IF PACKAGE IS DAMAGED]: Brand: DRI-LOK

## (undated) DEVICE — COVER ARMBRD W13XL28.5IN IMPERV BLU FOR OP RM

## (undated) DEVICE — NEEDLE 1/2IN TRCR PNT MAYO CATGUT #6

## (undated) DEVICE — SUTURE VCRL SZ 0 L27IN ABSRB UD L36MM CP-1 1/2 CIR REV CUT J267H

## (undated) DEVICE — GOWN,SIRUS,NONRNF,SETINSLV,XL,20/CS: Brand: MEDLINE

## (undated) DEVICE — 450 ML BOTTLE OF 0.05% CHLORHEXIDINE GLUCONATE IN 99.95% STERILE WATER FOR IRRIGATION, USP AND APPLICATOR.: Brand: IRRISEPT ANTIMICROBIAL WOUND LAVAGE

## (undated) DEVICE — BLADE SAW W55XL25MM THK038MM CUT THK064MM S STL SAG OSC

## (undated) DEVICE — SKIN AFFIX SURG ADHESIVE 72/CS 0.55ML: Brand: MEDLINE

## (undated) DEVICE — C-ARMOR C-ARM EQUIPMENT COVERS CLEAR STERILE UNIVERSAL FIT 12 PER CASE: Brand: C-ARMOR

## (undated) DEVICE — DRESSING,GAUZE,XEROFORM,CURAD,5"X9",ST: Brand: CURAD

## (undated) DEVICE — GAUZE,SPONGE,8"X4",12PLY,XRAY,STRL,LF: Brand: MEDLINE

## (undated) DEVICE — [AGGRESSIVE PLUS CUTTER, ARTHROSCOPIC SHAVER BLADE,  DO NOT RESTERILIZE,  DO NOT USE IF PACKAGE IS DAMAGED,  KEEP DRY,  KEEP AWAY FROM SUNLIGHT]: Brand: FORMULA

## (undated) DEVICE — HEWSON SUTURE RETRIEVER: Brand: HEWSON SUTURE RETRIEVER

## (undated) DEVICE — NEEDLE FREE SUT MAYO 1/2 CIR TRCR SZ 4 ST

## (undated) DEVICE — CHLORAPREP 26ML ORANGE

## (undated) DEVICE — TAPE,ELASTIC,FOAM,CURAD,4"X5.5YD,LF: Brand: CURAD

## (undated) DEVICE — SLING ARM L 33-38CM BLK MESH FAB EZ OPN FR PNL W/